# Patient Record
Sex: FEMALE | Race: WHITE | NOT HISPANIC OR LATINO | URBAN - METROPOLITAN AREA
[De-identification: names, ages, dates, MRNs, and addresses within clinical notes are randomized per-mention and may not be internally consistent; named-entity substitution may affect disease eponyms.]

---

## 2018-03-11 ENCOUNTER — EMERGENCY (EMERGENCY)
Facility: HOSPITAL | Age: 83
LOS: 1 days | End: 2018-03-11
Attending: EMERGENCY MEDICINE
Payer: MEDICARE

## 2018-03-11 VITALS
SYSTOLIC BLOOD PRESSURE: 152 MMHG | HEIGHT: 61 IN | TEMPERATURE: 98 F | WEIGHT: 126.99 LBS | HEART RATE: 91 BPM | RESPIRATION RATE: 14 BRPM | DIASTOLIC BLOOD PRESSURE: 76 MMHG | OXYGEN SATURATION: 94 %

## 2018-03-11 VITALS
DIASTOLIC BLOOD PRESSURE: 74 MMHG | RESPIRATION RATE: 15 BRPM | TEMPERATURE: 98 F | HEART RATE: 84 BPM | OXYGEN SATURATION: 95 % | SYSTOLIC BLOOD PRESSURE: 158 MMHG

## 2018-03-11 LAB
ALBUMIN SERPL ELPH-MCNC: 3.8 G/DL — SIGNIFICANT CHANGE UP (ref 3.3–5)
ALP SERPL-CCNC: 58 U/L — SIGNIFICANT CHANGE UP (ref 40–120)
ALT FLD-CCNC: 19 U/L — SIGNIFICANT CHANGE UP (ref 12–78)
ANION GAP SERPL CALC-SCNC: 9 MMOL/L — SIGNIFICANT CHANGE UP (ref 5–17)
AST SERPL-CCNC: 27 U/L — SIGNIFICANT CHANGE UP (ref 15–37)
BASOPHILS # BLD AUTO: 0.1 K/UL — SIGNIFICANT CHANGE UP (ref 0–0.2)
BASOPHILS NFR BLD AUTO: 0.8 % — SIGNIFICANT CHANGE UP (ref 0–2)
BILIRUB SERPL-MCNC: 0.6 MG/DL — SIGNIFICANT CHANGE UP (ref 0.2–1.2)
BLD GP AB SCN SERPL QL: SIGNIFICANT CHANGE UP
BUN SERPL-MCNC: 21 MG/DL — SIGNIFICANT CHANGE UP (ref 7–23)
CALCIUM SERPL-MCNC: 9.2 MG/DL — SIGNIFICANT CHANGE UP (ref 8.5–10.1)
CHLORIDE SERPL-SCNC: 107 MMOL/L — SIGNIFICANT CHANGE UP (ref 96–108)
CO2 SERPL-SCNC: 26 MMOL/L — SIGNIFICANT CHANGE UP (ref 22–31)
CREAT SERPL-MCNC: 1 MG/DL — SIGNIFICANT CHANGE UP (ref 0.5–1.3)
EOSINOPHIL # BLD AUTO: 0.3 K/UL — SIGNIFICANT CHANGE UP (ref 0–0.5)
EOSINOPHIL NFR BLD AUTO: 2.2 % — SIGNIFICANT CHANGE UP (ref 0–6)
GLUCOSE SERPL-MCNC: 104 MG/DL — HIGH (ref 70–99)
HCT VFR BLD CALC: 37.7 % — SIGNIFICANT CHANGE UP (ref 34.5–45)
HGB BLD-MCNC: 12.8 G/DL — SIGNIFICANT CHANGE UP (ref 11.5–15.5)
INR BLD: 1.05 RATIO — SIGNIFICANT CHANGE UP (ref 0.88–1.16)
LIDOCAIN IGE QN: 264 U/L — SIGNIFICANT CHANGE UP (ref 73–393)
LYMPHOCYTES # BLD AUTO: 17.4 % — SIGNIFICANT CHANGE UP (ref 13–44)
LYMPHOCYTES # BLD AUTO: 2.5 K/UL — SIGNIFICANT CHANGE UP (ref 1–3.3)
MCHC RBC-ENTMCNC: 29.9 PG — SIGNIFICANT CHANGE UP (ref 27–34)
MCHC RBC-ENTMCNC: 34 GM/DL — SIGNIFICANT CHANGE UP (ref 32–36)
MCV RBC AUTO: 87.9 FL — SIGNIFICANT CHANGE UP (ref 80–100)
MONOCYTES # BLD AUTO: 1 K/UL — HIGH (ref 0–0.9)
MONOCYTES NFR BLD AUTO: 6.6 % — SIGNIFICANT CHANGE UP (ref 1–9)
NEUTROPHILS # BLD AUTO: 10.5 K/UL — HIGH (ref 1.8–7.4)
NEUTROPHILS NFR BLD AUTO: 73 % — SIGNIFICANT CHANGE UP (ref 43–77)
OB PNL STL: POSITIVE
PLATELET # BLD AUTO: 219 K/UL — SIGNIFICANT CHANGE UP (ref 150–400)
POTASSIUM SERPL-MCNC: 3.8 MMOL/L — SIGNIFICANT CHANGE UP (ref 3.5–5.3)
POTASSIUM SERPL-SCNC: 3.8 MMOL/L — SIGNIFICANT CHANGE UP (ref 3.5–5.3)
PROT SERPL-MCNC: 7.6 G/DL — SIGNIFICANT CHANGE UP (ref 6–8.3)
PROTHROM AB SERPL-ACNC: 11.5 SEC — SIGNIFICANT CHANGE UP (ref 9.8–12.7)
RBC # BLD: 4.29 M/UL — SIGNIFICANT CHANGE UP (ref 3.8–5.2)
RBC # FLD: 11.6 % — SIGNIFICANT CHANGE UP (ref 10.3–14.5)
SODIUM SERPL-SCNC: 142 MMOL/L — SIGNIFICANT CHANGE UP (ref 135–145)
WBC # BLD: 14.3 K/UL — HIGH (ref 3.8–10.5)
WBC # FLD AUTO: 14.3 K/UL — HIGH (ref 3.8–10.5)

## 2018-03-11 PROCEDURE — 80053 COMPREHEN METABOLIC PANEL: CPT

## 2018-03-11 PROCEDURE — 86901 BLOOD TYPING SEROLOGIC RH(D): CPT

## 2018-03-11 PROCEDURE — 99284 EMERGENCY DEPT VISIT MOD MDM: CPT | Mod: 25

## 2018-03-11 PROCEDURE — 74177 CT ABD & PELVIS W/CONTRAST: CPT | Mod: 26

## 2018-03-11 PROCEDURE — 85027 COMPLETE CBC AUTOMATED: CPT

## 2018-03-11 PROCEDURE — 86850 RBC ANTIBODY SCREEN: CPT

## 2018-03-11 PROCEDURE — 83690 ASSAY OF LIPASE: CPT

## 2018-03-11 PROCEDURE — 99285 EMERGENCY DEPT VISIT HI MDM: CPT

## 2018-03-11 PROCEDURE — 86900 BLOOD TYPING SEROLOGIC ABO: CPT

## 2018-03-11 PROCEDURE — 74177 CT ABD & PELVIS W/CONTRAST: CPT

## 2018-03-11 PROCEDURE — 85610 PROTHROMBIN TIME: CPT

## 2018-03-11 PROCEDURE — 82272 OCCULT BLD FECES 1-3 TESTS: CPT

## 2018-03-11 RX ORDER — IOHEXOL 300 MG/ML
30 INJECTION, SOLUTION INTRAVENOUS ONCE
Qty: 0 | Refills: 0 | Status: COMPLETED | OUTPATIENT
Start: 2018-03-11 | End: 2018-03-11

## 2018-03-11 RX ORDER — METRONIDAZOLE 500 MG
500 TABLET ORAL ONCE
Qty: 0 | Refills: 0 | Status: COMPLETED | OUTPATIENT
Start: 2018-03-11 | End: 2018-03-11

## 2018-03-11 RX ORDER — CIPROFLOXACIN LACTATE 400MG/40ML
500 VIAL (ML) INTRAVENOUS ONCE
Qty: 0 | Refills: 0 | Status: COMPLETED | OUTPATIENT
Start: 2018-03-11 | End: 2018-03-11

## 2018-03-11 RX ORDER — METRONIDAZOLE 500 MG
1 TABLET ORAL
Qty: 30 | Refills: 0
Start: 2018-03-11 | End: 2018-03-20

## 2018-03-11 RX ORDER — MOXIFLOXACIN HYDROCHLORIDE TABLETS, 400 MG 400 MG/1
1 TABLET, FILM COATED ORAL
Qty: 20 | Refills: 0
Start: 2018-03-11 | End: 2018-03-20

## 2018-03-11 RX ADMIN — Medication 500 MILLIGRAM(S): at 19:06

## 2018-03-11 RX ADMIN — IOHEXOL 30 MILLILITER(S): 300 INJECTION, SOLUTION INTRAVENOUS at 15:24

## 2018-03-11 NOTE — ED PROVIDER NOTE - PROGRESS NOTE DETAILS
paged GI Dr. Mcneil [phone], awaiting call back patient comfortable, abdmonen soft, non tender, wants to go home, spoke with Dr. Mcneil, case discussed, will f/u in office, patient understands to return to ER if having severe pain, fever or worsening of symptoms

## 2018-03-11 NOTE — ED PROVIDER NOTE - OBJECTIVE STATEMENT
89 female presents to ER c/o rectal bleeding, started last night, bright red blood per rectum, associated with lower abdominal cramping, continued today and came to the ER.

## 2018-03-11 NOTE — ED PROVIDER NOTE - CARE PLAN
Principal Discharge DX:	Rectal bleed Principal Discharge DX:	Rectal bleed  Secondary Diagnosis:	Colitis

## 2018-03-11 NOTE — ED ADULT NURSE NOTE - CHPI ED SYMPTOMS NEG
no abdominal distension/no burning urination/no chills/no dysuria/no diarrhea/no vomiting/no fever/no hematuria/no nausea

## 2021-05-16 ENCOUNTER — EMERGENCY (EMERGENCY)
Facility: HOSPITAL | Age: 86
LOS: 1 days | Discharge: HOME | End: 2021-05-16
Attending: EMERGENCY MEDICINE | Admitting: EMERGENCY MEDICINE
Payer: MEDICARE

## 2021-05-16 VITALS
WEIGHT: 100.09 LBS | SYSTOLIC BLOOD PRESSURE: 136 MMHG | DIASTOLIC BLOOD PRESSURE: 80 MMHG | OXYGEN SATURATION: 95 % | TEMPERATURE: 98 F | RESPIRATION RATE: 14 BRPM | HEIGHT: 61 IN | HEART RATE: 76 BPM

## 2021-05-16 PROCEDURE — 73060 X-RAY EXAM OF HUMERUS: CPT | Mod: 26,LT

## 2021-05-16 PROCEDURE — 73502 X-RAY EXAM HIP UNI 2-3 VIEWS: CPT

## 2021-05-16 PROCEDURE — 99284 EMERGENCY DEPT VISIT MOD MDM: CPT | Mod: 25

## 2021-05-16 PROCEDURE — 73090 X-RAY EXAM OF FOREARM: CPT

## 2021-05-16 PROCEDURE — 99284 EMERGENCY DEPT VISIT MOD MDM: CPT

## 2021-05-16 PROCEDURE — 73502 X-RAY EXAM HIP UNI 2-3 VIEWS: CPT | Mod: 26,LT

## 2021-05-16 PROCEDURE — 73090 X-RAY EXAM OF FOREARM: CPT | Mod: 26,LT

## 2021-05-16 PROCEDURE — 73070 X-RAY EXAM OF ELBOW: CPT

## 2021-05-16 PROCEDURE — 73060 X-RAY EXAM OF HUMERUS: CPT

## 2021-05-16 PROCEDURE — 73070 X-RAY EXAM OF ELBOW: CPT | Mod: 26,LT

## 2021-05-16 NOTE — ED PROVIDER NOTE - CARE PROVIDER_API CALL
Olive Pitt)  Orthopaedic Surgery Surgery  [address] [place] [address]  Phone: [phone]  Fax: [phone]  Follow Up Time:

## 2021-05-16 NOTE — ED PROVIDER NOTE - PATIENT PORTAL LINK FT
Yes
You can access the FollowMyHealth Patient Portal offered by [facility] by registering at the following website: [email or web address]. By joining [facility]’s FollowMyHealth portal, you will also be able to view your health information using other applications (apps) compatible with our system.

## 2021-05-16 NOTE — ED PROVIDER NOTE - NSFOLLOWUPINSTRUCTIONS_ED_ALL_ED_FT
Sling to left arm for the next 4-5 days  Elevate arm until swelling resolves  Follow-up with Orthopedics if pains do not resolve within 7-10 days  Return here if needed

## 2021-05-16 NOTE — ED PROVIDER NOTE - PROGRESS NOTE DETAILS
Possibility of Fx still exists>>>>declines CT scan and she appropriately stated that not to much would be done with the results so why do it. Patient is a retired nurse.

## 2021-05-16 NOTE — ED PROVIDER NOTE - OBJECTIVE STATEMENT
89 yo white female, tripped and fell yesterday and since that time has been experiencing pain, swelling and discoloration to area of left elbow. In addition patient states that left groin is also a little painful but is still able ambulate. Did not hit head. No neck/chest/abdominal or back pains. No numbness or weakness to left upper extremity.

## 2021-05-16 NOTE — ED ADULT NURSE NOTE - NSIMPLEMENTINTERV_GEN_ALL_ED
Implemented All Fall with Harm Risk Interventions:  [place] to call system. Call bell, personal items and telephone within reach. Instruct patient to call for assistance. Room bathroom lighting operational. Non-slip footwear when patient is off stretcher. Physically safe environment: no spills, clutter or unnecessary equipment. Stretcher in lowest position, wheels locked, appropriate side rails in place. Provide visual cue, wrist band, yellow gown, etc. Monitor gait and stability. Monitor for mental status changes and reorient to person, place, and time. Review medications for side effects contributing to fall risk. Reinforce activity limits and safety measures with patient and family. Provide visual clues: red socks.

## 2021-05-16 NOTE — ED PROVIDER NOTE - MUSCULOSKELETAL, MLM
Marked swelling and ecchymosis distal left arm/elbow and proximal left forearm with FROM at elbow with intact N/V LUE. Hips and pelvis non tender and stable.

## 2021-05-16 NOTE — ED PROVIDER NOTE - CONSTITUTIONAL, MLM
normal... Well appearing, thin, elderly white female, awake, alert, oriented to person, place, time/situation and in no apparent distress.

## 2021-05-17 PROBLEM — M54.5 LOW BACK PAIN: Chronic | Status: ACTIVE | Noted: 2018-03-11

## 2021-05-17 PROBLEM — E78.5 HYPERLIPIDEMIA, UNSPECIFIED: Chronic | Status: ACTIVE | Noted: 2018-03-11

## 2021-05-17 PROBLEM — I10 ESSENTIAL (PRIMARY) HYPERTENSION: Chronic | Status: ACTIVE | Noted: 2018-03-11

## 2021-05-17 PROBLEM — J45.909 UNSPECIFIED ASTHMA, UNCOMPLICATED: Chronic | Status: ACTIVE | Noted: 2018-03-11

## 2021-05-22 NOTE — ED POST DISCHARGE NOTE - DETAILS
advised pt on abnormal results. advised return to ed for re-eval if pt has pain in the area. pt reports elbow and hip pain at this time. advised return to ed for reeval. pt would prefer to follow up with her own physician and then ortho. pt reports she removed sling. advised pt she should wear splint.

## 2021-05-28 ENCOUNTER — INPATIENT (INPATIENT)
Facility: HOSPITAL | Age: 86
LOS: 3 days | Discharge: NURSING, REHAB OR LONG-TERM CARE | DRG: 521 | End: 2021-06-01
Attending: FAMILY MEDICINE | Admitting: GENERAL PRACTICE
Payer: MEDICARE

## 2021-05-28 ENCOUNTER — TRANSCRIPTION ENCOUNTER (OUTPATIENT)
Age: 86
End: 2021-05-28

## 2021-05-28 VITALS
TEMPERATURE: 98 F | DIASTOLIC BLOOD PRESSURE: 71 MMHG | HEIGHT: 61 IN | OXYGEN SATURATION: 96 % | WEIGHT: 100.09 LBS | RESPIRATION RATE: 18 BRPM | HEART RATE: 70 BPM | SYSTOLIC BLOOD PRESSURE: 153 MMHG

## 2021-05-28 DIAGNOSIS — E78.5 HYPERLIPIDEMIA, UNSPECIFIED: ICD-10-CM

## 2021-05-28 DIAGNOSIS — S72.002A FRACTURE OF UNSPECIFIED PART OF NECK OF LEFT FEMUR, INITIAL ENCOUNTER FOR CLOSED FRACTURE: ICD-10-CM

## 2021-05-28 DIAGNOSIS — J44.9 CHRONIC OBSTRUCTIVE PULMONARY DISEASE, UNSPECIFIED: ICD-10-CM

## 2021-05-28 DIAGNOSIS — Z29.9 ENCOUNTER FOR PROPHYLACTIC MEASURES, UNSPECIFIED: ICD-10-CM

## 2021-05-28 DIAGNOSIS — I35.0 NONRHEUMATIC AORTIC (VALVE) STENOSIS: ICD-10-CM

## 2021-05-28 DIAGNOSIS — I10 ESSENTIAL (PRIMARY) HYPERTENSION: ICD-10-CM

## 2021-05-28 DIAGNOSIS — M10.9 GOUT, UNSPECIFIED: ICD-10-CM

## 2021-05-28 LAB
ANION GAP SERPL CALC-SCNC: 6 MMOL/L — SIGNIFICANT CHANGE UP (ref 5–17)
APTT BLD: 29.4 SEC — SIGNIFICANT CHANGE UP (ref 27.5–35.5)
BUN SERPL-MCNC: 23 MG/DL — SIGNIFICANT CHANGE UP (ref 7–23)
CALCIUM SERPL-MCNC: 8.8 MG/DL — SIGNIFICANT CHANGE UP (ref 8.5–10.1)
CHLORIDE SERPL-SCNC: 108 MMOL/L — SIGNIFICANT CHANGE UP (ref 96–108)
CO2 SERPL-SCNC: 29 MMOL/L — SIGNIFICANT CHANGE UP (ref 22–31)
CREAT SERPL-MCNC: 0.8 MG/DL — SIGNIFICANT CHANGE UP (ref 0.5–1.3)
GLUCOSE SERPL-MCNC: 113 MG/DL — HIGH (ref 70–99)
HCT VFR BLD CALC: 32.3 % — LOW (ref 34.5–45)
HGB BLD-MCNC: 10.5 G/DL — LOW (ref 11.5–15.5)
INR BLD: 1.06 RATIO — SIGNIFICANT CHANGE UP (ref 0.88–1.16)
MCHC RBC-ENTMCNC: 29.8 PG — SIGNIFICANT CHANGE UP (ref 27–34)
MCHC RBC-ENTMCNC: 32.5 GM/DL — SIGNIFICANT CHANGE UP (ref 32–36)
MCV RBC AUTO: 91.8 FL — SIGNIFICANT CHANGE UP (ref 80–100)
NRBC # BLD: 0 /100 WBCS — SIGNIFICANT CHANGE UP (ref 0–0)
PLATELET # BLD AUTO: 287 K/UL — SIGNIFICANT CHANGE UP (ref 150–400)
POTASSIUM SERPL-MCNC: 3.6 MMOL/L — SIGNIFICANT CHANGE UP (ref 3.5–5.3)
POTASSIUM SERPL-SCNC: 3.6 MMOL/L — SIGNIFICANT CHANGE UP (ref 3.5–5.3)
PROTHROM AB SERPL-ACNC: 12.4 SEC — SIGNIFICANT CHANGE UP (ref 10.6–13.6)
RBC # BLD: 3.52 M/UL — LOW (ref 3.8–5.2)
RBC # FLD: 13.8 % — SIGNIFICANT CHANGE UP (ref 10.3–14.5)
SARS-COV-2 RNA SPEC QL NAA+PROBE: SIGNIFICANT CHANGE UP
SODIUM SERPL-SCNC: 143 MMOL/L — SIGNIFICANT CHANGE UP (ref 135–145)
WBC # BLD: 12.99 K/UL — HIGH (ref 3.8–10.5)
WBC # FLD AUTO: 12.99 K/UL — HIGH (ref 3.8–10.5)

## 2021-05-28 PROCEDURE — 73502 X-RAY EXAM HIP UNI 2-3 VIEWS: CPT | Mod: 26,LT

## 2021-05-28 PROCEDURE — 93010 ELECTROCARDIOGRAM REPORT: CPT

## 2021-05-28 PROCEDURE — 73552 X-RAY EXAM OF FEMUR 2/>: CPT | Mod: 26,LT

## 2021-05-28 PROCEDURE — 99284 EMERGENCY DEPT VISIT MOD MDM: CPT

## 2021-05-28 PROCEDURE — 99221 1ST HOSP IP/OBS SF/LOW 40: CPT | Mod: GC

## 2021-05-28 PROCEDURE — 71045 X-RAY EXAM CHEST 1 VIEW: CPT | Mod: 26

## 2021-05-28 RX ORDER — SODIUM CHLORIDE 9 MG/ML
1000 INJECTION, SOLUTION INTRAVENOUS
Refills: 0 | Status: DISCONTINUED | OUTPATIENT
Start: 2021-05-28 | End: 2021-05-29

## 2021-05-28 RX ORDER — AMLODIPINE BESYLATE 2.5 MG/1
1 TABLET ORAL
Qty: 0 | Refills: 0 | DISCHARGE

## 2021-05-28 RX ORDER — MULTIVIT-MIN/FERROUS GLUCONATE 9 MG/15 ML
1 LIQUID (ML) ORAL
Qty: 0 | Refills: 0 | DISCHARGE

## 2021-05-28 RX ORDER — OMEGA-3 ACID ETHYL ESTERS 1 G
1 CAPSULE ORAL
Qty: 0 | Refills: 0 | DISCHARGE

## 2021-05-28 RX ORDER — MULTIVIT-MIN/FERROUS GLUCONATE 9 MG/15 ML
1 LIQUID (ML) ORAL DAILY
Refills: 0 | Status: DISCONTINUED | OUTPATIENT
Start: 2021-05-28 | End: 2021-05-29

## 2021-05-28 RX ORDER — ESOMEPRAZOLE MAGNESIUM 40 MG/1
1 CAPSULE, DELAYED RELEASE ORAL
Qty: 0 | Refills: 0 | DISCHARGE

## 2021-05-28 RX ORDER — ALLOPURINOL 300 MG
100 TABLET ORAL
Refills: 0 | Status: DISCONTINUED | OUTPATIENT
Start: 2021-05-28 | End: 2021-05-29

## 2021-05-28 RX ORDER — OMEGA-3 ACID ETHYL ESTERS 1 G
2 CAPSULE ORAL
Refills: 0 | Status: DISCONTINUED | OUTPATIENT
Start: 2021-05-28 | End: 2021-05-29

## 2021-05-28 RX ORDER — SIMVASTATIN 20 MG/1
20 TABLET, FILM COATED ORAL AT BEDTIME
Refills: 0 | Status: DISCONTINUED | OUTPATIENT
Start: 2021-05-28 | End: 2021-05-29

## 2021-05-28 RX ORDER — MOMETASONE FUROATE 220 UG/1
1 INHALANT RESPIRATORY (INHALATION) DAILY
Refills: 0 | Status: DISCONTINUED | OUTPATIENT
Start: 2021-05-28 | End: 2021-05-29

## 2021-05-28 RX ORDER — ERGOCALCIFEROL 1.25 MG/1
1 CAPSULE ORAL
Qty: 0 | Refills: 0 | DISCHARGE

## 2021-05-28 RX ORDER — AMLODIPINE BESYLATE 2.5 MG/1
2.5 TABLET ORAL DAILY
Refills: 0 | Status: DISCONTINUED | OUTPATIENT
Start: 2021-05-28 | End: 2021-05-29

## 2021-05-28 RX ORDER — ALBUTEROL 90 UG/1
2 AEROSOL, METERED ORAL EVERY 6 HOURS
Refills: 0 | Status: DISCONTINUED | OUTPATIENT
Start: 2021-05-28 | End: 2021-05-29

## 2021-05-28 RX ORDER — ALLOPURINOL 300 MG
1 TABLET ORAL
Qty: 0 | Refills: 0 | DISCHARGE

## 2021-05-28 RX ORDER — PANTOPRAZOLE SODIUM 20 MG/1
40 TABLET, DELAYED RELEASE ORAL
Refills: 0 | Status: DISCONTINUED | OUTPATIENT
Start: 2021-05-28 | End: 2021-05-29

## 2021-05-28 RX ORDER — ALBUTEROL 90 UG/1
2 AEROSOL, METERED ORAL
Qty: 0 | Refills: 0 | DISCHARGE

## 2021-05-28 RX ORDER — FLUTICASONE PROPIONATE 220 MCG
1 AEROSOL WITH ADAPTER (GRAM) INHALATION
Qty: 0 | Refills: 0 | DISCHARGE

## 2021-05-28 RX ORDER — SIMVASTATIN 20 MG/1
1 TABLET, FILM COATED ORAL
Qty: 0 | Refills: 0 | DISCHARGE

## 2021-05-28 RX ADMIN — SIMVASTATIN 20 MILLIGRAM(S): 20 TABLET, FILM COATED ORAL at 23:00

## 2021-05-28 NOTE — H&P ADULT - NSHPPHYSICALEXAM_GEN_ALL_CORE
T(C): 36.9 (05-28-21 @ 20:58), Max: 36.9 (05-28-21 @ 20:58)  HR: 71 (05-28-21 @ 20:58) (70 - 71)  BP: 165/72 (05-28-21 @ 20:58) (153/71 - 165/72)  RR: 19 (05-28-21 @ 20:58) (18 - 19)  SpO2: 97% (05-28-21 @ 20:58) (96% - 97%)    GENERAL: patient appears frail elderly, sitting in bed, no acute distress, appropriate, pleasant  EYES: sclera clear, no exudates, EOMI, PERRL  ENMT: oropharynx clear without erythema, no exudates, moist mucous membranes  LUNGS: good air entry bilaterally, clear to auscultation, symmetric breath sounds, no wheezing or rhonchi appreciated  HEART: S1, regular rate and rhythm,3/6  holosystolic murmur heard best at RUSB and LUSB, no lower extremity edema, pulses 2+  GASTROINTESTINAL: abdomen is soft, nontender, nondistended, normoactive bowel sounds, no palpable masses  INTEGUMENT: thin skin, scattered ecchymoses of left upper arm and lower arm  MUSCULOSKELETAL: no clubbing or cyanosis, no deformity  NEUROLOGIC: awake, alert, oriented x3, moves all limbs, no sensory deficits,   PSYCHIATRIC: mood is good, affect is congruent, linear and logical thought process  HEME/LYMPH: scattered ecchymoses of left upper arm and lower arm, large hematoma of the left elbow

## 2021-05-28 NOTE — ED PROVIDER NOTE - WET READ LAUNCH FT
There are 2 Wet Read(s) to document. There are 3 Wet Read(s) to document. There are no Wet Read(s) to document.

## 2021-05-28 NOTE — H&P ADULT - NSHPREVIEWOFSYSTEMS_GEN_ALL_CORE
CONSTITUTIONAL: denies fever, chills, fatigue, weakness  HEENT: denies blurred vision, sore throat  SKIN: denies new lesions, rash  CARDIOVASCULAR: denies chest pain, chest pressure, palpitations  RESPIRATORY: denies shortness of breath, sputum production  GASTROINTESTINAL: denies nausea, vomiting, diarrhea, abdominal pain  GENITOURINARY: denies dysuria, discharge  NEUROLOGICAL: denies numbness, headache, focal weakness  MUSCULOSKELETAL: ADMITS new joint pain, denies muscle aches  HEMATOLOGIC: ADMITS gross bleeding, hematoma of left elbow  LYMPHATICS: denies enlarged lymph nodes, extremity swelling  PSYCHIATRIC: denies recent changes in anxiety, depression  ENDOCRINOLOGIC: denies sweating, cold or heat intolerance

## 2021-05-28 NOTE — H&P ADULT - PROBLEM SELECTOR PLAN 4
- Chronic, well controlled, asymptomatic. Denies use of her proAir in recent weeks.  - Home medications Flovent inhaler 110 daily and proAir inhaler as needed

## 2021-05-28 NOTE — H&P ADULT - NSHPSOCIALHISTORY_GEN_ALL_CORE
Lives with her children  Ambulates with walker in the house and cane outside the home, Performs all ADLs  Denies tobacco use  Denies etoh use   Denies other drug use

## 2021-05-28 NOTE — ED ADULT NURSE NOTE - NSIMPLEMENTINTERV_GEN_ALL_ED
Implemented All Fall with Harm Risk Interventions:  [place] to call system. Call bell, personal items and telephone within reach. Instruct patient to call for assistance. Room bathroom lighting operational. Non-slip footwear when patient is off stretcher. Physically safe environment: no spills, clutter or unnecessary equipment. Stretcher in lowest position, wheels locked, appropriate side rails in place. Provide visual cue, wrist band, yellow gown, etc. Monitor gait and stability. Monitor for mental status changes and reorient to person, place, and time. Review medications for side effects contributing to fall risk. Reinforce activity limits and safety measures with patient and family. Provide visual clues: red socks.

## 2021-05-28 NOTE — H&P ADULT - HISTORY OF PRESENT ILLNESS
89 yo F PMHx HTN, HLD, Asthma presenting for Left hip pain. nt reports that she was instructed to come to the ED today after MRI revealed nondisplaced left hip fracture. Patient reports that she has been ambulating with pain since the injury.        ED Course  VS T 97.7 HR 70 /71 RR 18 SpO2 96% on RA    Labs significant for WBC 11.9, Hg 10.5   EKG   89 yo F PMHx Aortic Stenosis,  HTN, HLD, Asthma/COPD, GERD, Gout, presenting for Left hip pain on ambulating x  2 weeks.  Patient reports that she was instructed to come to the ED today after MRI revealed nondisplaced left hip fracture. Patient reports that she has been ambulating with pain since the fall using a walker. For the pain she has been taking Ecotin 325 two pills a day for pain relief for the hip. She states the hematoma on her elbow is painful only when touched.  Patient reports she has a known history of aortic valve disorder and her Cardiologist Dr. Oliveira is closely following her with TTE to determine if she is a candidate for valve replacement.  Patient states can walk distances on flat surfaces without shortness of breath but does have TITUS on hills or stairs. She states she took all her medications today prior to coming to the hospital tonight.  Currently denies CP, SOB, palpitations, dizziness, headache, nausea, or pain.       ED Course: Patient did not receive any treatments in the ED prior to admission  VS T 97.7 HR 70 /71 RR 18 SpO2 96% on RA    Labs significant for WBC 11.9, Hg 10.5   EKG ; NSR with infrequent PVC and RBBB

## 2021-05-28 NOTE — CONSULT NOTE ADULT - SUBJECTIVE AND OBJECTIVE BOX
89F presents to [facility] with son for evaluation of left hip fracture. Patient reports that she was sent in to the ED by Dr. Gamez with plans for surgery with Dr. Marx tomorrow for left hip fracture. Patient reports that she had fall on May 16th and was evaluated in the Emergency Department here and subsequently discharged after XR imaging. Patient was called by ED and instructed to follow up with orthopedist for evaluation of left elbow fracture so patient followed up with Dr. Gamez in the office. She reports that she was told by Dr. Gamez that she did not have a left elbow fracture, but he was concerned about her left hip pain so sent her for MRI imaging since XR imaging did not reveal fracture. Patient reports that she was instructed to come to the ED today after MRI revealed nondisplaced left hip fracture. Patient reports that she has been ambulating with pain since the injury. Patient complains of hematoma/swelling and bruising of her left elbow but no significant pain. Otherwise patient denies head strike/LOC, numbness/tingling, weakness, any other acute orthopedic injury or complaint.    Vital Signs Last 24 Hrs  T(C): 36.5 (28 May 2021 18:30), Max: 36.5 (28 May 2021 18:30)  T(F): 97.7 (28 May 2021 18:30), Max: 97.7 (28 May 2021 18:30)  HR: 70 (28 May 2021 18:30) (70 - 70)  BP: 153/71 (28 May 2021 18:30) (153/71 - 153/71)  BP(mean): --  RR: 18 (28 May 2021 18:30) (18 - 18)  SpO2: 96% (28 May 2021 18:30) (96% - 96%)    Exam:  General: Awake, alert, no acute distress  LLE:   Skin intact. No obvious abrasions/lacerations. No obvious deformity appreciated  TTP over proximal femur/hip. No other bony TTP on exam.   Able to SLR bilaterally  Minimal to no pain with axial load/log roll.   SILT, +Q/H/Gsc/TA/EHL/FHL  DP pulse intact  Compartments soft and compressible  No calf tenderness bilaterally    Secondary Assessment:  NC/AT, NTTP of clavicles, NTTP of C-,T-,L-Spine  UEs: (+) large hematoma over left elbow with diffuse ecchymosis over left elbow/forearm but no overlying TTP; NTTP of Shoulders, Elbows, Wrists, Hands; NT with AROM/PROM of Shoulders, Elbows, Wrists, Hands; AIN/PIN/Med/Uln/Msc/Rad/Ax intact  RLE: Able to SLR, NT with Log Roll, NT with Heel Strike, NTTP of Hip, Knee, Ankle, Foot; NT with AROM/PROM of Hip, Knee, Ankle, Foot; Q/H/Gsc/TA/EHL/FHL intact    XR imaging ordered/pending.

## 2021-05-28 NOTE — ED PROVIDER NOTE - CLINICAL SUMMARY MEDICAL DECISION MAKING FREE TEXT BOX
89 yo F sent in by Dr. Gamez with L impacted femor neck fx sp mechanical fall 2 wks ago found on outpatient MRI today-- here for OR tomorrow

## 2021-05-28 NOTE — H&P ADULT - PROBLEM SELECTOR PLAN 1
- Nondisplaced Left hip fracture Evaluated by Orthopedist group Dr. Marx  - Non weight bearing per orthopedics  - Patient reports she does not have pain at rest  - DASH diet, NPO after midnight for possible surgery tomorrow  - Given severity of AS would defer medical clearance for surgery based on TTE and cardiologist evaluation in the AM

## 2021-05-28 NOTE — ED PROVIDER NOTE - OBJECTIVE STATEMENT
89 yo F PMHx HTN, HLD presents to ED for evaluation of L impacted femoral neck fracture sustained after mechanical fall ~2 weeks ago. Fx diagnosed on outpatient MRI today. Pt reports pain to L hip worse with walking, able to ambulate without walker. Sent in by Dr. Gaemz for surgery with Dr. Marx tomorrow morning. Pt currently denies any pain or symptoms.

## 2021-05-28 NOTE — ED ADULT NURSE NOTE - OBJECTIVE STATEMENT
Pt. received alert and oriented x3 with chief complaint of being sent in by PMD for hip surgery due to fall in early May. Pt. denies any pain to affected area. Pt. presents w/ hematoma to left elbow from fall.

## 2021-05-28 NOTE — H&P ADULT - ATTENDING COMMENTS
89 y/o F with history of asthma/COPD, HTN, HLD who presents to the ED for evaluation of left hip pain.   Patient fell two weeks ago at home. She missed a step and fell on her left side. She has been ambulating with a walker. She had a MRI showing a nondisplaced left hip fracture.     A/P:   1. L nondisplaced impacted femoral neck fracture.   2. COPD/asthma   3. HTN  4. HLD  5. AS    Patient has a history of AS and informed us that she was being worked up for TAVR. She has a prominent murmur on exam. Will check ECHO and consult cardio. Medical clearance pending ECHO to determine severity of AS and cardiac input. home meds reordered. NPO after midnight. SCDs for tonight.

## 2021-05-28 NOTE — H&P ADULT - ASSESSMENT
89 yo F PMHx Aortic Stenosis,  HTN, HLD, Asthma/COPD, GERD, Gout, presenting for Left hip pain on ambulating x  2 weeks. Admitted for reduction of nondisplaced left hip fracture.

## 2021-05-28 NOTE — ED ADULT NURSE NOTE - CHIEF COMPLAINT QUOTE
Patient is a 89 yo female was seen 5/16/2021 in [facility] for fall patient went to orthopedist past Wednesday and received a MRI and MRI showed a left partial hip fracture Patient sent by CHANDU Gamez for surgery to stabilize left hip

## 2021-05-28 NOTE — ED ADULT TRIAGE NOTE - CHIEF COMPLAINT QUOTE
Patient is a 89 yo female was seen 5/16/2021 in [facility] for fall patient went to orthopedist past Wednesday and received a MRI and MRI showed a left partial hip fracture Patient sent by CHANDU Gamez for surgery to stabilize left hip

## 2021-05-28 NOTE — ED PROVIDER NOTE - ATTENDING CONTRIBUTION TO CARE
Pt seen and examined and d/w PA.  agree with a and p.  pt is a 89 yo female sp fall about 2 weeks ago and with hip pain, pt at that time told imaging of left hip normal. pt state pain increased and no further falls and was ambulating with difficulty. pt today fu with dr scales of ortho and o/p mri revealed left hip fx. pt sent to er for preop eval and admit.  pt on exam in nad, lungs cta, cor: rrr no mgr, abd soft, nt, ext, left hip ttp, no deformity on exam, sm intact, 2+ pulses. check labs, ortho residents consulted, admit med

## 2021-05-29 ENCOUNTER — RESULT REVIEW (OUTPATIENT)
Age: 86
End: 2021-05-29

## 2021-05-29 DIAGNOSIS — R33.9 RETENTION OF URINE, UNSPECIFIED: ICD-10-CM

## 2021-05-29 LAB
ANION GAP SERPL CALC-SCNC: 6 MMOL/L — SIGNIFICANT CHANGE UP (ref 5–17)
ANION GAP SERPL CALC-SCNC: 6 MMOL/L — SIGNIFICANT CHANGE UP (ref 5–17)
APPEARANCE UR: CLEAR — SIGNIFICANT CHANGE UP
APTT BLD: 28.1 SEC — SIGNIFICANT CHANGE UP (ref 27.5–35.5)
BILIRUB UR-MCNC: NEGATIVE — SIGNIFICANT CHANGE UP
BUN SERPL-MCNC: 14 MG/DL — SIGNIFICANT CHANGE UP (ref 7–23)
BUN SERPL-MCNC: 15 MG/DL — SIGNIFICANT CHANGE UP (ref 7–23)
CALCIUM SERPL-MCNC: 8.3 MG/DL — LOW (ref 8.5–10.1)
CALCIUM SERPL-MCNC: 9 MG/DL — SIGNIFICANT CHANGE UP (ref 8.5–10.1)
CHLORIDE SERPL-SCNC: 105 MMOL/L — SIGNIFICANT CHANGE UP (ref 96–108)
CHLORIDE SERPL-SCNC: 105 MMOL/L — SIGNIFICANT CHANGE UP (ref 96–108)
CO2 SERPL-SCNC: 29 MMOL/L — SIGNIFICANT CHANGE UP (ref 22–31)
CO2 SERPL-SCNC: 32 MMOL/L — HIGH (ref 22–31)
COLOR SPEC: YELLOW — SIGNIFICANT CHANGE UP
COVID-19 SPIKE DOMAIN AB INTERP: POSITIVE
COVID-19 SPIKE DOMAIN ANTIBODY RESULT: 48.5 U/ML — HIGH
CREAT SERPL-MCNC: 0.69 MG/DL — SIGNIFICANT CHANGE UP (ref 0.5–1.3)
CREAT SERPL-MCNC: 0.75 MG/DL — SIGNIFICANT CHANGE UP (ref 0.5–1.3)
DIFF PNL FLD: ABNORMAL
GLUCOSE SERPL-MCNC: 152 MG/DL — HIGH (ref 70–99)
GLUCOSE SERPL-MCNC: 94 MG/DL — SIGNIFICANT CHANGE UP (ref 70–99)
GLUCOSE UR QL: NEGATIVE — SIGNIFICANT CHANGE UP
HCT VFR BLD CALC: 31.6 % — LOW (ref 34.5–45)
HCT VFR BLD CALC: 35.9 % — SIGNIFICANT CHANGE UP (ref 34.5–45)
HGB BLD-MCNC: 10.2 G/DL — LOW (ref 11.5–15.5)
HGB BLD-MCNC: 11.6 G/DL — SIGNIFICANT CHANGE UP (ref 11.5–15.5)
INR BLD: 1.12 RATIO — SIGNIFICANT CHANGE UP (ref 0.88–1.16)
KETONES UR-MCNC: NEGATIVE — SIGNIFICANT CHANGE UP
LEUKOCYTE ESTERASE UR-ACNC: NEGATIVE — SIGNIFICANT CHANGE UP
MCHC RBC-ENTMCNC: 29.1 PG — SIGNIFICANT CHANGE UP (ref 27–34)
MCHC RBC-ENTMCNC: 29.4 PG — SIGNIFICANT CHANGE UP (ref 27–34)
MCHC RBC-ENTMCNC: 32.3 GM/DL — SIGNIFICANT CHANGE UP (ref 32–36)
MCHC RBC-ENTMCNC: 32.3 GM/DL — SIGNIFICANT CHANGE UP (ref 32–36)
MCV RBC AUTO: 90 FL — SIGNIFICANT CHANGE UP (ref 80–100)
MCV RBC AUTO: 91.1 FL — SIGNIFICANT CHANGE UP (ref 80–100)
NITRITE UR-MCNC: NEGATIVE — SIGNIFICANT CHANGE UP
NRBC # BLD: 0 /100 WBCS — SIGNIFICANT CHANGE UP (ref 0–0)
NRBC # BLD: 0 /100 WBCS — SIGNIFICANT CHANGE UP (ref 0–0)
PH UR: 8 — SIGNIFICANT CHANGE UP (ref 5–8)
PLATELET # BLD AUTO: 286 K/UL — SIGNIFICANT CHANGE UP (ref 150–400)
PLATELET # BLD AUTO: 308 K/UL — SIGNIFICANT CHANGE UP (ref 150–400)
POTASSIUM SERPL-MCNC: 3.6 MMOL/L — SIGNIFICANT CHANGE UP (ref 3.5–5.3)
POTASSIUM SERPL-MCNC: 4 MMOL/L — SIGNIFICANT CHANGE UP (ref 3.5–5.3)
POTASSIUM SERPL-SCNC: 3.6 MMOL/L — SIGNIFICANT CHANGE UP (ref 3.5–5.3)
POTASSIUM SERPL-SCNC: 4 MMOL/L — SIGNIFICANT CHANGE UP (ref 3.5–5.3)
PROT UR-MCNC: 30 MG/DL
PROTHROM AB SERPL-ACNC: 13 SEC — SIGNIFICANT CHANGE UP (ref 10.6–13.6)
RBC # BLD: 3.47 M/UL — LOW (ref 3.8–5.2)
RBC # BLD: 3.99 M/UL — SIGNIFICANT CHANGE UP (ref 3.8–5.2)
RBC # FLD: 13.6 % — SIGNIFICANT CHANGE UP (ref 10.3–14.5)
RBC # FLD: 13.7 % — SIGNIFICANT CHANGE UP (ref 10.3–14.5)
SARS-COV-2 IGG+IGM SERPL QL IA: 48.5 U/ML — HIGH
SARS-COV-2 IGG+IGM SERPL QL IA: POSITIVE
SODIUM SERPL-SCNC: 140 MMOL/L — SIGNIFICANT CHANGE UP (ref 135–145)
SODIUM SERPL-SCNC: 143 MMOL/L — SIGNIFICANT CHANGE UP (ref 135–145)
SP GR SPEC: 1.01 — SIGNIFICANT CHANGE UP (ref 1.01–1.02)
UROBILINOGEN FLD QL: NEGATIVE — SIGNIFICANT CHANGE UP
WBC # BLD: 13.6 K/UL — HIGH (ref 3.8–10.5)
WBC # BLD: 17.79 K/UL — HIGH (ref 3.8–10.5)
WBC # FLD AUTO: 13.6 K/UL — HIGH (ref 3.8–10.5)
WBC # FLD AUTO: 17.79 K/UL — HIGH (ref 3.8–10.5)

## 2021-05-29 PROCEDURE — 99233 SBSQ HOSP IP/OBS HIGH 50: CPT | Mod: GC

## 2021-05-29 PROCEDURE — 73501 X-RAY EXAM HIP UNI 1 VIEW: CPT | Mod: 26,LT

## 2021-05-29 PROCEDURE — 88311 DECALCIFY TISSUE: CPT | Mod: 26

## 2021-05-29 PROCEDURE — 88305 TISSUE EXAM BY PATHOLOGIST: CPT | Mod: 26

## 2021-05-29 RX ORDER — ENOXAPARIN SODIUM 100 MG/ML
40 INJECTION SUBCUTANEOUS DAILY
Refills: 0 | Status: DISCONTINUED | OUTPATIENT
Start: 2021-05-30 | End: 2021-06-01

## 2021-05-29 RX ORDER — METOCLOPRAMIDE HCL 10 MG
5 TABLET ORAL ONCE
Refills: 0 | Status: DISCONTINUED | OUTPATIENT
Start: 2021-05-29 | End: 2021-05-29

## 2021-05-29 RX ORDER — ONDANSETRON 8 MG/1
4 TABLET, FILM COATED ORAL EVERY 6 HOURS
Refills: 0 | Status: DISCONTINUED | OUTPATIENT
Start: 2021-05-29 | End: 2021-06-01

## 2021-05-29 RX ORDER — ACETAMINOPHEN 500 MG
650 TABLET ORAL EVERY 6 HOURS
Refills: 0 | Status: DISCONTINUED | OUTPATIENT
Start: 2021-05-29 | End: 2021-06-01

## 2021-05-29 RX ORDER — FOLIC ACID 0.8 MG
1 TABLET ORAL DAILY
Refills: 0 | Status: DISCONTINUED | OUTPATIENT
Start: 2021-05-29 | End: 2021-06-01

## 2021-05-29 RX ORDER — TRAMADOL HYDROCHLORIDE 50 MG/1
50 TABLET ORAL EVERY 6 HOURS
Refills: 0 | Status: DISCONTINUED | OUTPATIENT
Start: 2021-05-29 | End: 2021-05-29

## 2021-05-29 RX ORDER — ASCORBIC ACID 60 MG
500 TABLET,CHEWABLE ORAL
Refills: 0 | Status: DISCONTINUED | OUTPATIENT
Start: 2021-05-29 | End: 2021-06-01

## 2021-05-29 RX ORDER — HYDROMORPHONE HYDROCHLORIDE 2 MG/ML
0.5 INJECTION INTRAMUSCULAR; INTRAVENOUS; SUBCUTANEOUS ONCE
Refills: 0 | Status: DISCONTINUED | OUTPATIENT
Start: 2021-05-29 | End: 2021-05-29

## 2021-05-29 RX ORDER — OXYCODONE HYDROCHLORIDE 5 MG/1
5 TABLET ORAL EVERY 4 HOURS
Refills: 0 | Status: DISCONTINUED | OUTPATIENT
Start: 2021-05-29 | End: 2021-06-01

## 2021-05-29 RX ORDER — SODIUM CHLORIDE 9 MG/ML
1000 INJECTION, SOLUTION INTRAVENOUS
Refills: 0 | Status: DISCONTINUED | OUTPATIENT
Start: 2021-05-29 | End: 2021-05-29

## 2021-05-29 RX ORDER — OXYCODONE HYDROCHLORIDE 5 MG/1
5 TABLET ORAL
Refills: 0 | Status: DISCONTINUED | OUTPATIENT
Start: 2021-05-29 | End: 2021-05-29

## 2021-05-29 RX ORDER — CEFAZOLIN SODIUM 1 G
2000 VIAL (EA) INJECTION EVERY 8 HOURS
Refills: 0 | Status: COMPLETED | OUTPATIENT
Start: 2021-05-29 | End: 2021-05-30

## 2021-05-29 RX ORDER — HYDROMORPHONE HYDROCHLORIDE 2 MG/ML
0.5 INJECTION INTRAMUSCULAR; INTRAVENOUS; SUBCUTANEOUS
Refills: 0 | Status: DISCONTINUED | OUTPATIENT
Start: 2021-05-29 | End: 2021-05-29

## 2021-05-29 RX ORDER — OXYCODONE HYDROCHLORIDE 5 MG/1
10 TABLET ORAL
Refills: 0 | Status: DISCONTINUED | OUTPATIENT
Start: 2021-05-29 | End: 2021-05-29

## 2021-05-29 RX ORDER — ACETAMINOPHEN 500 MG
650 TABLET ORAL EVERY 6 HOURS
Refills: 0 | Status: DISCONTINUED | OUTPATIENT
Start: 2021-05-29 | End: 2021-05-29

## 2021-05-29 RX ORDER — AMLODIPINE BESYLATE 2.5 MG/1
2.5 TABLET ORAL DAILY
Refills: 0 | Status: DISCONTINUED | OUTPATIENT
Start: 2021-05-29 | End: 2021-05-29

## 2021-05-29 RX ORDER — POLYETHYLENE GLYCOL 3350 17 G/17G
17 POWDER, FOR SOLUTION ORAL AT BEDTIME
Refills: 0 | Status: DISCONTINUED | OUTPATIENT
Start: 2021-05-29 | End: 2021-06-01

## 2021-05-29 RX ORDER — PANTOPRAZOLE SODIUM 20 MG/1
40 TABLET, DELAYED RELEASE ORAL
Refills: 0 | Status: DISCONTINUED | OUTPATIENT
Start: 2021-05-29 | End: 2021-06-01

## 2021-05-29 RX ORDER — SENNA PLUS 8.6 MG/1
2 TABLET ORAL AT BEDTIME
Refills: 0 | Status: DISCONTINUED | OUTPATIENT
Start: 2021-05-29 | End: 2021-06-01

## 2021-05-29 RX ORDER — OXYCODONE HYDROCHLORIDE 5 MG/1
2.5 TABLET ORAL EVERY 4 HOURS
Refills: 0 | Status: DISCONTINUED | OUTPATIENT
Start: 2021-05-29 | End: 2021-06-01

## 2021-05-29 RX ORDER — SODIUM CHLORIDE 9 MG/ML
1000 INJECTION, SOLUTION INTRAVENOUS
Refills: 0 | Status: DISCONTINUED | OUTPATIENT
Start: 2021-05-29 | End: 2021-05-30

## 2021-05-29 RX ORDER — CEFAZOLIN SODIUM 1 G
2000 VIAL (EA) INJECTION EVERY 8 HOURS
Refills: 0 | Status: DISCONTINUED | OUTPATIENT
Start: 2021-05-29 | End: 2021-05-29

## 2021-05-29 RX ORDER — SODIUM CHLORIDE 9 MG/ML
1000 INJECTION, SOLUTION INTRAVENOUS ONCE
Refills: 0 | Status: DISCONTINUED | OUTPATIENT
Start: 2021-05-29 | End: 2021-05-29

## 2021-05-29 RX ORDER — ENOXAPARIN SODIUM 100 MG/ML
30 INJECTION SUBCUTANEOUS EVERY 12 HOURS
Refills: 0 | Status: DISCONTINUED | OUTPATIENT
Start: 2021-05-29 | End: 2021-05-29

## 2021-05-29 RX ORDER — BENZOCAINE AND MENTHOL 5; 1 G/100ML; G/100ML
1 LIQUID ORAL
Refills: 0 | Status: DISCONTINUED | OUTPATIENT
Start: 2021-05-29 | End: 2021-06-01

## 2021-05-29 RX ADMIN — SENNA PLUS 2 TABLET(S): 8.6 TABLET ORAL at 21:42

## 2021-05-29 RX ADMIN — HYDROMORPHONE HYDROCHLORIDE 0.5 MILLIGRAM(S): 2 INJECTION INTRAMUSCULAR; INTRAVENOUS; SUBCUTANEOUS at 18:05

## 2021-05-29 RX ADMIN — SODIUM CHLORIDE 75 MILLILITER(S): 9 INJECTION, SOLUTION INTRAVENOUS at 18:13

## 2021-05-29 RX ADMIN — SODIUM CHLORIDE 75 MILLILITER(S): 9 INJECTION, SOLUTION INTRAVENOUS at 02:26

## 2021-05-29 RX ADMIN — HYDROMORPHONE HYDROCHLORIDE 0.5 MILLIGRAM(S): 2 INJECTION INTRAMUSCULAR; INTRAVENOUS; SUBCUTANEOUS at 18:20

## 2021-05-29 RX ADMIN — PANTOPRAZOLE SODIUM 40 MILLIGRAM(S): 20 TABLET, DELAYED RELEASE ORAL at 05:47

## 2021-05-29 RX ADMIN — MOMETASONE FUROATE 1 PUFF(S): 220 INHALANT RESPIRATORY (INHALATION) at 05:47

## 2021-05-29 RX ADMIN — AMLODIPINE BESYLATE 2.5 MILLIGRAM(S): 2.5 TABLET ORAL at 05:46

## 2021-05-29 NOTE — PATIENT PROFILE ADULT - ARE SIGNIFICANT INDICATORS COMPLETE.
verbalizes understanding/demonstrates understanding of teaching/good return demonstration verbalizes understanding/needs met No Yes

## 2021-05-29 NOTE — PROGRESS NOTE ADULT - NUTRITIONAL ASSESSMENT
This patient has been assessed with a concern for Malnutrition and has been determined to have a diagnosis/diagnoses of Severe protein-calorie malnutrition.    This patient is being managed with:   Diet DASH/TLC-  Sodium & Cholesterol Restricted  Entered: May 28 2021  9:11PM    Diet NPO after Midnight-     NPO Start Date: 28-May-2021   NPO Start Time: 23:59  Except Medications  Entered: May 28 2021  6:45PM     This patient has been assessed with a concern for Malnutrition and has been determined to have a diagnosis/diagnoses of Severe protein-calorie malnutrition.    This patient is being managed with:   Diet DASH/TLC-  Sodium & Cholesterol Restricted  Entered: May 28 2021  9:11PM    Diet NPO after Midnight-     NPO Start Date: 28-May-2021   NPO Start Time: 23:59  Except Medications  Entered: May 28 2021  6:45PM

## 2021-05-29 NOTE — DIETITIAN INITIAL EVALUATION ADULT. - OTHER INFO
89 yo F PMHx Aortic Stenosis,  HTN, HLD, Asthma/COPD, GERD, Gout, presenting for Left hip pain on ambulating x  2 weeks. Admitted for reduction of nondisplaced left hip fracture.  Pt reports she lost ~40# over the past 3 years or so due to frequent illnesses and such. Each time she has a problem, ie. bronchitis, she loses a little wt and doesn't regain it.  Pt currently NPO since MN for possible hip procedure today.

## 2021-05-29 NOTE — DIETITIAN NUTRITION RISK NOTIFICATION - ADDITIONAL COMMENTS/DIETITIAN RECOMMENDATIONS
Pt also with 40#/ 28% wt loss over 3 years.  Recommend after procedure, advance diet to dash/tlc with 8oz ensure enlive bid. Discussed supplement use after discharge as well.

## 2021-05-29 NOTE — PATIENT PROFILE ADULT - ABILITY TO HEAR (WITH HEARING AID OR HEARING APPLIANCE IF NORMALLY USED):
wears hearing aid/Mildly to Moderately Impaired: difficulty hearing in some environments or speaker may need to increase volume or speak distinctly

## 2021-05-29 NOTE — CONSULT NOTE ADULT - ASSESSMENT
89F with nondisplaced impacted L femoral neck fracture diagnosed on outpatient MRI    Plan:  Medical management appreciated - please document medical clearance/optimization for OR  XR L Hip/Femur/Pelvis ordered  Preop CXR/COVID/Labs/T&S/EKG ordered   NWB/Strict bedrest   NPO after midnight for OR tomorrow; IVF while NPO  Hold DVT ppx after midnight for OR tomorrow; SCDs okay  Discussed plan for surgery tomorrow with patient and son at bedside. All questions answered to patient and son's satisfaction.   Plan for OR for CRPP with Dr. Marx tomorrow   Discussed with attending Dr. Marx who agrees with treatment plan
89F with known severe aortic valve stenosis with normal LVEF presents with LEFT hip fracture    Suggest:  1.  She is high risk for anesthesia with LEFT hip fracture repair  2.  Continue with Zocor  3. Pain mgmt  4. Continue with amlodipine.  Would increase to 5 mg if persistent elevated BP despite pain mgmt.  5. Will follow.

## 2021-05-29 NOTE — PROGRESS NOTE ADULT - ATTENDING COMMENTS
Patient seen and examined. States she feels well. Denies any pain at rest, only when she walks. Planned for OR today. RCRI score-0, Class 1 risk, however given patient's severe aortic stenosis, patient is high risk for urgent OR procedure.

## 2021-05-29 NOTE — PROGRESS NOTE ADULT - PROBLEM SELECTOR PLAN 3
- pt with c/o suprapubic pressure and urinary hesitancy today  - afebrile, mild leukocytosis noted  - bladder scan with 534cc residual - straight catch ordered  - UA with mod blood -- suspect 2/2 straight cath trauma  - f/u UCx  - repeat bladder scan in 8hrs to reassess

## 2021-05-29 NOTE — DIETITIAN INITIAL EVALUATION ADULT. - PROBLEM SELECTOR PLAN 2
- Per patient, Cardiologist Dr. Oliveira following aortic murmur with frequent TTEs to evaluate for TAVR  - Patient states she is has dyspnea on exertion when walking up stairs, no sob on flat surfaces  - Based on clinical evaluation Aortic stenosis is moderate to severe.  --TTE ordered for the AM  - Given severity of AS would defer medical clearance for surgery based on TTE and cardiologist evaluation in the AM  - Cardiology consulted, Dr. Marin

## 2021-05-29 NOTE — PROGRESS NOTE ADULT - SUBJECTIVE AND OBJECTIVE BOX
Orthopedics Post-op Check  POD 0 s/p L Hemiarthroplasty Hip    Patient seen and examined at bedside. Reporting pain her hip but overall controlled with medications. No cp, sob, nausea or vomiting.    Vital Signs Last 24 Hrs  T(C): 36.8 (05-29-21 @ 17:53), Max: 37.1 (05-29-21 @ 02:00)  T(F): 98.2 (05-29-21 @ 17:53), Max: 98.7 (05-29-21 @ 02:00)  HR: 91 (05-29-21 @ 17:56) (70 - 91)  BP: 163/57 (05-29-21 @ 17:56) (142/62 - 178/76)  BP(mean): --  RR: 19 (05-29-21 @ 17:56) (18 - 19)  SpO2: 100% (05-29-21 @ 17:56) (92% - 100%)                        11.6   13.60 )-----------( 308      ( 29 May 2021 06:17 )             35.9     29 May 2021 06:17    143    |  105    |  15     ----------------------------<  94     3.6     |  32     |  0.69     Ca    9.0        29 May 2021 06:17      PT/INR - ( 29 May 2021 06:17 )   PT: 13.0 sec;   INR: 1.12 ratio         PTT - ( 29 May 2021 06:17 )  PTT:28.1 sec    Exam:  Gen: NAD, resting comfortably  Dressing c/d/i  +EHL/FHL/TA/GS  SILT L2-S1  +DP/PT 2+  Calf NTTP b/l  Compartments soft and compressible    A/P:  89yFemale Stable POD 0  s/p L Hip Hemiarthroplasty    -Medical and cardiology management appreciated  -Home meds continued  -FU AM labs  -WBAT  -Pain control PRN  -PT/OT  -Ppx ABX [id number]  -DVT PE ppx: Lovenox - hold until POD1  -Incentive spirometry  -Dispo pending PT evaluation  -Will discuss with attending Dr. Marx and advise if any changes to plan

## 2021-05-29 NOTE — DIETITIAN NUTRITION RISK NOTIFICATION - TREATMENT: THE FOLLOWING DIET HAS BEEN RECOMMENDED
Diet, DASH/TLC:   Sodium & Cholesterol Restricted (05-28-21 @ 21:12) [Active]  Diet, NPO after Midnight:      NPO Start Date: 28-May-2021,   NPO Start Time: 23:59  Except Medications (05-28-21 @ 18:49) [Active]

## 2021-05-29 NOTE — PROGRESS NOTE ADULT - SUBJECTIVE AND OBJECTIVE BOX
Patient is a 89y old  Female who presents with a chief complaint of Closed Reduction of Left Hip Fracture (29 May 2021 10:53)      INTERVAL HPI/OVERNIGHT EVENTS: Patient seen and examined at bedside. No acute events overnight. Today, pt states her pain in controlled. She is able to move all 4 extremities. Admits to tender left elbow when it is touched. States she feels lower abd pressure and feels like she is not fully voiding.    MEDICATIONS  (STANDING):  allopurinol 100 milliGRAM(s) Oral two times a day  amLODIPine   Tablet 2.5 milliGRAM(s) Oral daily  lactated ringers. 1000 milliLiter(s) (75 mL/Hr) IV Continuous <Continuous>  mometasone 220 MICROgram(s) Inhaler 1 Puff(s) Inhalation daily  multivitamin/minerals 1 Tablet(s) Oral daily  omega-3-Acid Ethyl Esters 2 Gram(s) Oral two times a day  pantoprazole    Tablet 40 milliGRAM(s) Oral before breakfast  simvastatin 20 milliGRAM(s) Oral at bedtime    MEDICATIONS  (PRN):  ALBUTerol    90 MICROgram(s) HFA Inhaler 2 Puff(s) Inhalation every 6 hours PRN Shortness of Breath and/or Wheezing      Allergies    No Known Allergies    Intolerances        REVIEW OF SYSTEMS:  CONSTITUTIONAL: No fever or chills  HEENT:  No headache, no sore throat  RESPIRATORY: No cough, wheezing, or shortness of breath  CARDIOVASCULAR: No chest pain, palpitations  GASTROINTESTINAL: admits lower abd pressure, No nausea, vomiting, or diarrhea  GENITOURINARY: No dysuria, frequency, or hematuria; admits hesitancy   NEUROLOGICAL: no focal weakness or dizziness  MUSCULOSKELETAL: admits left elbow pain with palpation    Vital Signs Last 24 Hrs  T(C): 36.5 (29 May 2021 05:30), Max: 37.1 (29 May 2021 02:00)  T(F): 97.7 (29 May 2021 05:30), Max: 98.7 (29 May 2021 02:00)  HR: 70 (29 May 2021 05:30) (70 - 78)  BP: 142/62 (29 May 2021 05:30) (142/62 - 178/76)  BP(mean): --  RR: 18 (29 May 2021 05:30) (18 - 19)  SpO2: 93% (29 May 2021 05:30) (93% - 97%)    PHYSICAL EXAM:  GENERAL: NAD, elderly  HEENT:  anicteric, moist mucous membranes  CHEST/LUNG:  CTA b/l, no rales, wheezes, or rhonchi  HEART:  RRR, S1, S2, crescendo-decrescendo murmur   ABDOMEN:  BS+, soft, nontender, nondistended  EXTREMITIES: +hematoma left elbow(TTP); no cyanosis, or calf tenderness  NERVOUS SYSTEM: answers questions and follows commands appropriately    LABS:                        11.6   13.60 )-----------( 308      ( 29 May 2021 06:17 )             35.9     CBC Full  -  ( 29 May 2021 06:17 )  WBC Count : 13.60 K/uL  Hemoglobin : 11.6 g/dL  Hematocrit : 35.9 %  Platelet Count - Automated : 308 K/uL  Mean Cell Volume : 90.0 fl  Mean Cell Hemoglobin : 29.1 pg  Mean Cell Hemoglobin Concentration : 32.3 gm/dL  Auto Neutrophil # : x  Auto Lymphocyte # : x  Auto Monocyte # : x  Auto Eosinophil # : x  Auto Basophil # : x  Auto Neutrophil % : x  Auto Lymphocyte % : x  Auto Monocyte % : x  Auto Eosinophil % : x  Auto Basophil % : x    29 May 2021 06:17    143    |  105    |  15     ----------------------------<  94     3.6     |  32     |  0.69     Ca    9.0        29 May 2021 06:17      PT/INR - ( 29 May 2021 06:17 )   PT: 13.0 sec;   INR: 1.12 ratio         PTT - ( 29 May 2021 06:17 )  PTT:28.1 sec  Urinalysis Basic - ( 29 May 2021 08:53 )    Color: Yellow / Appearance: Clear / S.010 / pH: x  Gluc: x / Ketone: Negative  / Bili: Negative / Urobili: Negative   Blood: x / Protein: 30 mg/dL / Nitrite: Negative   Leuk Esterase: Negative / RBC: 6-10 /HPF / WBC 0-2   Sq Epi: x / Non Sq Epi: Negative / Bacteria: x      CAPILLARY BLOOD GLUCOSE

## 2021-05-29 NOTE — PROGRESS NOTE ADULT - PROBLEM SELECTOR PLAN 1
- Nondisplaced Left hip fracture Evaluated by Orthopedist group Dr. Marx  - Non weight bearing per orthopedics  - Patient reports she does not have pain at rest  - DASH diet, NPO after midnight for surgery today  - Cardio eval noted. Given history of AS patient is high risk for a moderate risk orthopedic procedure. Patient is optimized as best as possible to proceed with planned procedure with routine hemodynamic monitoring. - Nondisplaced Left hip fracture Evaluated by Orthopedist group Dr. Marx  - Non weight bearing per orthopedics  - Patient reports she does not have pain at rest  - DASH diet, NPO after midnight for surgery today  - Cardio eval noted. Given history of AS patient is high risk for an intermediate risk orthopedic procedure. Patient is optimized as best as possible to proceed with planned procedure with routine hemodynamic monitoring.

## 2021-05-29 NOTE — DIETITIAN INITIAL EVALUATION ADULT. - ORAL INTAKE PTA/DIET HISTORY
Pt reports that PTA, she was still doing everything herself including driving and food shopping.  NKFA, denies problems chewing or swallowing although doesn't have salads.

## 2021-05-29 NOTE — PROGRESS NOTE ADULT - SUBJECTIVE AND OBJECTIVE BOX
Orthopedics     Patient seen and examined at bedside, resting comfortably. No acute events overnight. Pain adequately controlled. Patient feeling well. Denies CP/SOB. No nausea or vomiting. No other acute complaints at this time. Plan for OR pending clearances.    Vital Signs Last 24 Hrs  T(C): 36.5 (05-29-21 @ 05:30), Max: 37.1 (05-29-21 @ 02:00)  T(F): 97.7 (05-29-21 @ 05:30), Max: 98.7 (05-29-21 @ 02:00)  HR: 70 (05-29-21 @ 05:30) (70 - 78)  BP: 142/62 (05-29-21 @ 05:30) (142/62 - 178/76)  BP(mean): --  RR: 18 (05-29-21 @ 05:30) (18 - 19)  SpO2: 93% (05-29-21 @ 05:30) (93% - 97%)                        11.6   13.60 )-----------( 308      ( 29 May 2021 06:17 )             35.9     29 May 2021 06:17    143    |  105    |  15     ----------------------------<  94     3.6     |  32     |  0.69     Ca    9.0        29 May 2021 06:17  PT/INR - ( 29 May 2021 06:17 )   PT: 13.0 sec;   INR: 1.12 ratio    PTT - ( 29 May 2021 06:17 )  PTT:28.1 sec    Exam:  Gen: NAD, Awake and alert  LLE  Skin intact  Approx 1-2cm short compared to contralateral side  +EHL/FHL/TA/GS  SILT L2-S1  +DP  Calf Soft NT  Compartments soft and compressible

## 2021-05-29 NOTE — DIETITIAN INITIAL EVALUATION ADULT. - PHYSICAL ASSESSMENT TEMPLES
You can access the FollowMyHealth Patient Portal offered by [facility] by registering at the following website: [email or web address]. By joining [employer]’s FollowMyHealth portal, you will also be able to view your health information using other applications (apps) compatible with our system. mild

## 2021-05-29 NOTE — PROGRESS NOTE ADULT - ASSESSMENT
A/P:  Patient is a 89y Female L hip fx plan for OR pending clearances    -Medical management per primary team - please document on clearance/optimization status for OR  -Cardiology recs appreciated - please document on clearance/optimization status for OR  -NPO except meds  -IVF while NPO  -Hold chem dvt ppx  -FU am labs  -FU TTE  -Ice/Elevate  -Incentive Spirometry  -Multimodal Analgesia  -SCDs  -PT/OT OOB  -NWB LLE, strict bedrest  -Will discuss w/ attending and advise if plan changes

## 2021-05-29 NOTE — CONSULT NOTE ADULT - SUBJECTIVE AND OBJECTIVE BOX
[facility] Cardiology Consult - Ngoc Marin Augustin Shelby [phone]    CHIEF COMPLAINT: Patient is a 89y old  Female who presents with a chief complaint of Closed Reduction of Left Hip Fracture (28 May 2021 20:13)      HPI:  89 yo F PMHx Aortic Stenosis,  HTN, HLD, Asthma/COPD, GERD, Gout, presenting for Left hip pain on ambulating x  2 weeks.  Patient reports that she was instructed to come to the ED today after MRI revealed nondisplaced left hip fracture. Patient reports that she has been ambulating with pain since the fall using a walker. For the pain she has been taking Ecotin 325 two pills a day for pain relief for the hip. She states the hematoma on her elbow is painful only when touched.  Patient reports she has a known history of aortic valve disorder and her Cardiologist Dr. Oliveira is closely following her with TTE to determine if she is a candidate for valve replacement.  Patient states can walk distances on flat surfaces without shortness of breath but does have TITUS on hills or stairs. She states she took all her medications today prior to coming to the hospital [facility].  Currently denies CP, SOB, palpitations, dizziness, headache, nausea, or pain.       ED Course: Patient did not receive any treatments in the ED prior to admission  VS T 97.7 HR 70 /71 RR 18 SpO2 96% on RA    Labs significant for WBC 11.9, Hg 10.5   EKG ; NSR with infrequent PVC and RBBB    (28 May 2021 20:13)      PAST MEDICAL & SURGICAL HISTORY:  Hypertension    Asthma    Hyperlipidemia    Lumbar back pain  Pinched nerve    Fractured hip    No significant past surgical history        SOCIAL HISTORY: Alochol: Denied  Smoking: Nonsmoker  Drug Use: Denied  Marital Status:         FAMILY HISTORY: FAMILY HISTORY:      MEDICATIONS  (STANDING):  allopurinol 100 milliGRAM(s) Oral two times a day  amLODIPine   Tablet 2.5 milliGRAM(s) Oral daily  lactated ringers. 1000 milliLiter(s) (75 mL/Hr) IV Continuous <Continuous>  mometasone 220 MICROgram(s) Inhaler 1 Puff(s) Inhalation daily  multivitamin/minerals 1 Tablet(s) Oral daily  omega-3-Acid Ethyl Esters 2 Gram(s) Oral two times a day  pantoprazole    Tablet 40 milliGRAM(s) Oral before breakfast  simvastatin 20 milliGRAM(s) Oral at bedtime    MEDICATIONS  (PRN):  ALBUTerol    90 MICROgram(s) HFA Inhaler 2 Puff(s) Inhalation every 6 hours PRN Shortness of Breath and/or Wheezing      Allergies    No Known Allergies    Intolerances        REVIEW OF SYSTEMS:  CONSTITUTIONAL: No weakness, fevers or chills  EYES/ENT: No visual changes;  No vertigo or throat pain   NECK: No pain or stiffness  RESPIRATORY: No cough, wheezing, hemoptysis; No shortness of breath  CARDIOVASCULAR: No chest pain or palpitations  GASTROINTESTINAL: No abdominal pain. No nausea, vomiting, or hematemesis; No diarrhea or constipation. No melena or hematochezia.  GENITOURINARY: No dysuria, frequency or hematuria  NEUROLOGICAL: No numbness or weakness  SKIN: No itching or rash  All other review of systems is negative unless indicated above    VITAL SIGNS:   Vital Signs Last 24 Hrs  T(C): 36.5 (29 May 2021 05:30), Max: 37.1 (29 May 2021 02:00)  T(F): 97.7 (29 May 2021 05:30), Max: 98.7 (29 May 2021 02:00)  HR: 70 (29 May 2021 05:30) (70 - 78)  BP: 142/62 (29 May 2021 05:30) (142/62 - 178/76)  BP(mean): --  RR: 18 (29 May 2021 05:30) (18 - 19)  SpO2: 93% (29 May 2021 05:30) (93% - 97%)    I&O's Summary    28 May 2021 07:01  -  29 May 2021 07:00  --------------------------------------------------------  IN: 375 mL / OUT: 550 mL / NET: -175 mL        PHYSICAL EXAM:  Constitutional: Awake in NAD  HEENT:  LISA, EOMI  Neck: No JVD  Pulmonary: CTA B/L No R/R/W  Cardiovascular: PMI not palpable non-displaced Regular S1 and S2, no murmurs, rubs, gallops or clicks  Gastrointestinal: Bowel Sounds present, soft, nontender.   Extremities: Trace peripheral edema.   Neuro: No gross focal deficits    LABS: All Labs Reviewed:                        11.6   13.60 )-----------( 308      ( 29 May 2021 06:17 )             35.9                         10.5   12.99 )-----------( 287      ( 28 May 2021 19:45 )             32.3     29 May 2021 06:17    143    |  105    |  15     ----------------------------<  94     3.6     |  32     |  0.69   28 May 2021 19:45    143    |  108    |  23     ----------------------------<  113    3.6     |  29     |  0.80     Ca    9.0        29 May 2021 06:17  Ca    8.8        28 May 2021 19:45      PT/INR - ( 29 May 2021 06:17 )   PT: 13.0 sec;   INR: 1.12 ratio         PTT - ( 29 May 2021 06:17 )  PTT:28.1 sec      Blood Culture:         RADIOLOGY/EKG:     [facility] Cardiology Consult - Ngoc Marin Augustin Shelby [phone]    CHIEF COMPLAINT: Patient is a 89y old  Female who presents with a chief complaint of Closed Reduction of Left Hip Fracture (28 May 2021 20:13)      HPI:  89 yo F PMHx Aortic Stenosis,  HTN, HLD, Asthma/COPD, GERD, Gout, presenting for Left hip pain on ambulating x  2 weeks.  Patient reports that she was instructed to come to the ED today after MRI revealed nondisplaced left hip fracture. Patient reports that she has been ambulating with pain since the fall using a walker. For the pain she has been taking Ecotin 325 two pills a day for pain relief for the hip. She states the hematoma on her elbow is painful only when touched.  Patient reports she has a known history of aortic valve disorder and her Cardiologist Dr. Oliveira is closely following her with TTE to determine if she is a candidate for valve replacement.  Patient states can walk distances on flat surfaces without shortness of breath but does have TITUS on hills or stairs. She states she took all her medications today prior to coming to the hospital [facility].  Currently denies CP, SOB, palpitations, dizziness, headache, nausea, or pain.       ED Course: Patient did not receive any treatments in the ED prior to admission  VS T 97.7 HR 70 /71 RR 18 SpO2 96% on RA    Labs significant for WBC 11.9, Hg 10.5   EKG ; NSR with infrequent PVC and RBBB    (28 May 2021 20:13)      PAST MEDICAL & SURGICAL HISTORY:  Hypertension    Asthma    Hyperlipidemia    Lumbar back pain  Pinched nerve    Fractured hip    No significant past surgical history        SOCIAL HISTORY: Alochol: Denied  Smoking: Nonsmoker  Drug Use: Denied  Marital Status:         FAMILY HISTORY: FAMILY HISTORY:      MEDICATIONS  (STANDING):  allopurinol 100 milliGRAM(s) Oral two times a day  amLODIPine   Tablet 2.5 milliGRAM(s) Oral daily  lactated ringers. 1000 milliLiter(s) (75 mL/Hr) IV Continuous <Continuous>  mometasone 220 MICROgram(s) Inhaler 1 Puff(s) Inhalation daily  multivitamin/minerals 1 Tablet(s) Oral daily  omega-3-Acid Ethyl Esters 2 Gram(s) Oral two times a day  pantoprazole    Tablet 40 milliGRAM(s) Oral before breakfast  simvastatin 20 milliGRAM(s) Oral at bedtime    MEDICATIONS  (PRN):  ALBUTerol    90 MICROgram(s) HFA Inhaler 2 Puff(s) Inhalation every 6 hours PRN Shortness of Breath and/or Wheezing      Allergies    No Known Allergies    Intolerances        REVIEW OF SYSTEMS:  CONSTITUTIONAL: No weakness, fevers or chills  EYES/ENT: No visual changes;  No vertigo or throat pain   NECK: No pain or stiffness  RESPIRATORY: No cough, wheezing, hemoptysis; No shortness of breath  CARDIOVASCULAR: No chest pain or palpitations  GASTROINTESTINAL: No abdominal pain. No nausea, vomiting, or hematemesis; No diarrhea or constipation. No melena or hematochezia.  GENITOURINARY: No dysuria, frequency or hematuria  NEUROLOGICAL: No numbness or weakness  SKIN: No itching or rash  All other review of systems is negative unless indicated above    VITAL SIGNS:   Vital Signs Last 24 Hrs  T(C): 36.5 (29 May 2021 05:30), Max: 37.1 (29 May 2021 02:00)  T(F): 97.7 (29 May 2021 05:30), Max: 98.7 (29 May 2021 02:00)  HR: 70 (29 May 2021 05:30) (70 - 78)  BP: 142/62 (29 May 2021 05:30) (142/62 - 178/76)  BP(mean): --  RR: 18 (29 May 2021 05:30) (18 - 19)  SpO2: 93% (29 May 2021 05:30) (93% - 97%)    I&O's Summary    28 May 2021 07:01  -  29 May 2021 07:00  --------------------------------------------------------  IN: 375 mL / OUT: 550 mL / NET: -175 mL        PHYSICAL EXAM:  Constitutional: Awake in NAD  HEENT:  LISA, EOMI  Neck: No JVD  Pulmonary: CTA B/L No R/R/W  Cardiovascular: PMI not palpable non-displaced Regular S1 and S2, MICHAEL 3/6  Gastrointestinal: Bowel Sounds present, soft, nontender.   Extremities: Trace peripheral edema.   Neuro: No gross focal deficits    LABS: All Labs Reviewed:                        11.6   13.60 )-----------( 308      ( 29 May 2021 06:17 )             35.9                         10.5   12.99 )-----------( 287      ( 28 May 2021 19:45 )             32.3     29 May 2021 06:17    143    |  105    |  15     ----------------------------<  94     3.6     |  32     |  0.69   28 May 2021 19:45    143    |  108    |  23     ----------------------------<  113    3.6     |  29     |  0.80     Ca    9.0        29 May 2021 06:17  Ca    8.8        28 May 2021 19:45      PT/INR - ( 29 May 2021 06:17 )   PT: 13.0 sec;   INR: 1.12 ratio         PTT - ( 29 May 2021 06:17 )  PTT:28.1 sec      Blood Culture:         RADIOLOGY/EKG:  < from: TTE Echo Complete w/o Contrast w/ Doppler (05.29.21 @ 08:06) >  Impression:  1. This a very technically limited study.  2. There is normal left ventricular size and normal left ventricular systolic function with an ejection fraction of 65%.  3. There is diastolic dysfunction.  4. There is mild left ventricular hypertrophy.  5. There is a trileaflet aortic valve with significant aortic valve calcification with severe aortic valve stenosis. Her mean aortic valve gradient of 64 mmHg and peak aortic valve gradient of 99 mmHg. The aortic valve area was calculated to be 0.72 sq cm. The peak aortic valve velocity is 5 m/s  6. There is mild aortic insufficiency.  7. There is significant mitral annular calcification with moderate mitral regurgitation.  8. There is trivial tricuspid regurgitation.  9. The pulmonic valve was not well visualized on this study.  10. No significant pericardial effusion.  11. There is normal right atrial and right ventricular size and systolic function.      < end of copied text >

## 2021-05-29 NOTE — PROGRESS NOTE ADULT - ASSESSMENT
89 yo F PMHx Aortic Stenosis,  HTN, HLD, Asthma/COPD, GERD, Gout, presenting for Left hip pain on ambulating x  2 weeks. Admitted for reduction of nondisplaced left hip fracture.

## 2021-05-30 LAB
ANION GAP SERPL CALC-SCNC: 7 MMOL/L — SIGNIFICANT CHANGE UP (ref 5–17)
BASOPHILS # BLD AUTO: 0.03 K/UL — SIGNIFICANT CHANGE UP (ref 0–0.2)
BASOPHILS NFR BLD AUTO: 0.2 % — SIGNIFICANT CHANGE UP (ref 0–2)
BUN SERPL-MCNC: 15 MG/DL — SIGNIFICANT CHANGE UP (ref 7–23)
CALCIUM SERPL-MCNC: 8.2 MG/DL — LOW (ref 8.5–10.1)
CHLORIDE SERPL-SCNC: 101 MMOL/L — SIGNIFICANT CHANGE UP (ref 96–108)
CO2 SERPL-SCNC: 31 MMOL/L — SIGNIFICANT CHANGE UP (ref 22–31)
CREAT SERPL-MCNC: 0.71 MG/DL — SIGNIFICANT CHANGE UP (ref 0.5–1.3)
CULTURE RESULTS: NO GROWTH — SIGNIFICANT CHANGE UP
EOSINOPHIL # BLD AUTO: 0 K/UL — SIGNIFICANT CHANGE UP (ref 0–0.5)
EOSINOPHIL NFR BLD AUTO: 0 % — SIGNIFICANT CHANGE UP (ref 0–6)
GLUCOSE SERPL-MCNC: 128 MG/DL — HIGH (ref 70–99)
HCT VFR BLD CALC: 30.3 % — LOW (ref 34.5–45)
HGB BLD-MCNC: 9.8 G/DL — LOW (ref 11.5–15.5)
IMM GRANULOCYTES NFR BLD AUTO: 0.5 % — SIGNIFICANT CHANGE UP (ref 0–1.5)
LYMPHOCYTES # BLD AUTO: 1.26 K/UL — SIGNIFICANT CHANGE UP (ref 1–3.3)
LYMPHOCYTES # BLD AUTO: 7.9 % — LOW (ref 13–44)
MCHC RBC-ENTMCNC: 29.5 PG — SIGNIFICANT CHANGE UP (ref 27–34)
MCHC RBC-ENTMCNC: 32.3 GM/DL — SIGNIFICANT CHANGE UP (ref 32–36)
MCV RBC AUTO: 91.3 FL — SIGNIFICANT CHANGE UP (ref 80–100)
MONOCYTES # BLD AUTO: 1.02 K/UL — HIGH (ref 0–0.9)
MONOCYTES NFR BLD AUTO: 6.4 % — SIGNIFICANT CHANGE UP (ref 2–14)
NEUTROPHILS # BLD AUTO: 13.61 K/UL — HIGH (ref 1.8–7.4)
NEUTROPHILS NFR BLD AUTO: 85 % — HIGH (ref 43–77)
NRBC # BLD: 0 /100 WBCS — SIGNIFICANT CHANGE UP (ref 0–0)
PLATELET # BLD AUTO: 265 K/UL — SIGNIFICANT CHANGE UP (ref 150–400)
POTASSIUM SERPL-MCNC: 4.4 MMOL/L — SIGNIFICANT CHANGE UP (ref 3.5–5.3)
POTASSIUM SERPL-SCNC: 4.4 MMOL/L — SIGNIFICANT CHANGE UP (ref 3.5–5.3)
RBC # BLD: 3.32 M/UL — LOW (ref 3.8–5.2)
RBC # FLD: 13.7 % — SIGNIFICANT CHANGE UP (ref 10.3–14.5)
SODIUM SERPL-SCNC: 139 MMOL/L — SIGNIFICANT CHANGE UP (ref 135–145)
SPECIMEN SOURCE: SIGNIFICANT CHANGE UP
WBC # BLD: 16 K/UL — HIGH (ref 3.8–10.5)
WBC # FLD AUTO: 16 K/UL — HIGH (ref 3.8–10.5)

## 2021-05-30 PROCEDURE — 99232 SBSQ HOSP IP/OBS MODERATE 35: CPT | Mod: GC

## 2021-05-30 RX ORDER — MOMETASONE FUROATE 220 UG/1
1 INHALANT RESPIRATORY (INHALATION) DAILY
Refills: 0 | Status: DISCONTINUED | OUTPATIENT
Start: 2021-05-30 | End: 2021-06-01

## 2021-05-30 RX ORDER — ALBUTEROL 90 UG/1
2 AEROSOL, METERED ORAL EVERY 6 HOURS
Refills: 0 | Status: DISCONTINUED | OUTPATIENT
Start: 2021-05-30 | End: 2021-06-01

## 2021-05-30 RX ORDER — ALLOPURINOL 300 MG
100 TABLET ORAL
Refills: 0 | Status: DISCONTINUED | OUTPATIENT
Start: 2021-05-30 | End: 2021-06-01

## 2021-05-30 RX ORDER — AMLODIPINE BESYLATE 2.5 MG/1
2.5 TABLET ORAL DAILY
Refills: 0 | Status: DISCONTINUED | OUTPATIENT
Start: 2021-05-30 | End: 2021-06-01

## 2021-05-30 RX ORDER — SIMVASTATIN 20 MG/1
20 TABLET, FILM COATED ORAL AT BEDTIME
Refills: 0 | Status: DISCONTINUED | OUTPATIENT
Start: 2021-05-30 | End: 2021-06-01

## 2021-05-30 RX ADMIN — POLYETHYLENE GLYCOL 3350 17 GRAM(S): 17 POWDER, FOR SOLUTION ORAL at 21:50

## 2021-05-30 RX ADMIN — Medication 1 MILLIGRAM(S): at 11:29

## 2021-05-30 RX ADMIN — SIMVASTATIN 20 MILLIGRAM(S): 20 TABLET, FILM COATED ORAL at 21:51

## 2021-05-30 RX ADMIN — OXYCODONE HYDROCHLORIDE 5 MILLIGRAM(S): 5 TABLET ORAL at 10:11

## 2021-05-30 RX ADMIN — SENNA PLUS 2 TABLET(S): 8.6 TABLET ORAL at 21:51

## 2021-05-30 RX ADMIN — OXYCODONE HYDROCHLORIDE 5 MILLIGRAM(S): 5 TABLET ORAL at 11:11

## 2021-05-30 RX ADMIN — PANTOPRAZOLE SODIUM 40 MILLIGRAM(S): 20 TABLET, DELAYED RELEASE ORAL at 05:20

## 2021-05-30 RX ADMIN — Medication 1 TABLET(S): at 11:29

## 2021-05-30 RX ADMIN — Medication 500 MILLIGRAM(S): at 05:19

## 2021-05-30 RX ADMIN — Medication 100 MILLIGRAM(S): at 00:42

## 2021-05-30 RX ADMIN — ENOXAPARIN SODIUM 40 MILLIGRAM(S): 100 INJECTION SUBCUTANEOUS at 11:29

## 2021-05-30 RX ADMIN — Medication 100 MILLIGRAM(S): at 17:07

## 2021-05-30 RX ADMIN — Medication 500 MILLIGRAM(S): at 17:07

## 2021-05-30 RX ADMIN — Medication 100 MILLIGRAM(S): at 07:18

## 2021-05-30 NOTE — PROGRESS NOTE ADULT - SUBJECTIVE AND OBJECTIVE BOX
[facility] Cardiology Progress Note [phone] (Dr. Marin, Ngoc, Heron, Augustin)    CHIEF COMPLAINT: Patient is a 89y old  Female who presents with a chief complaint of Closed Reduction of Left Hip Fracture (29 May 2021 18:04)      Follow Up Today: The patient denies any chest discomfort or shortness of breath.    HPI:  89 yo F PMHx Aortic Stenosis,  HTN, HLD, Asthma/COPD, GERD, Gout, presenting for Left hip pain on ambulating x  2 weeks.  Patient reports that she was instructed to come to the ED today after MRI revealed nondisplaced left hip fracture. Patient reports that she has been ambulating with pain since the fall using a walker. For the pain she has been taking Ecotin 325 two pills a day for pain relief for the hip. She states the hematoma on her elbow is painful only when touched.  Patient reports she has a known history of aortic valve disorder and her Cardiologist Dr. Oliveira is closely following her with TTE to determine if she is a candidate for valve replacement.  Patient states can walk distances on flat surfaces without shortness of breath but does have TITUS on hills or stairs. She states she took all her medications today prior to coming to the hospital tonight.  Currently denies CP, SOB, palpitations, dizziness, headache, nausea, or pain.       ED Course: Patient did not receive any treatments in the ED prior to admission  VS T 97.7 HR 70 /71 RR 18 SpO2 96% on RA    Labs significant for WBC 11.9, Hg 10.5   EKG ; NSR with infrequent PVC and RBBB    (28 May 2021 20:13)      PAST MEDICAL & SURGICAL HISTORY:  Hypertension    Asthma    Hyperlipidemia    Lumbar back pain  Pinched nerve    Fractured hip    No significant past surgical history        MEDICATIONS  (STANDING):  ascorbic acid 500 milliGRAM(s) Oral two times a day  enoxaparin Injectable 40 milliGRAM(s) SubCutaneous daily  folic acid 1 milliGRAM(s) Oral daily  lactated ringers. 1000 milliLiter(s) (75 mL/Hr) IV Continuous <Continuous>  multivitamin 1 Tablet(s) Oral daily  pantoprazole    Tablet 40 milliGRAM(s) Oral before breakfast  polyethylene glycol 3350 17 Gram(s) Oral at bedtime  senna 2 Tablet(s) Oral at bedtime    MEDICATIONS  (PRN):  acetaminophen   Tablet .. 650 milliGRAM(s) Oral every 6 hours PRN Temp greater or equal to 38C (100.4F), Mild Pain (1 - 3)  benzocaine 15 mG/menthol 3.6 mG (Sugar-Free) Lozenge 1 Lozenge Oral every 2 hours PRN Sore Throat  ondansetron Injectable 4 milliGRAM(s) IV Push every 6 hours PRN Nausea and/or Vomiting  oxyCODONE    IR 2.5 milliGRAM(s) Oral every 4 hours PRN Moderate Pain (4 - 6)  oxyCODONE    IR 5 milliGRAM(s) Oral every 4 hours PRN Severe Pain (7 - 10)      Allergies    No Known Allergies    Intolerances        REVIEW OF SYSTEMS:    All other review of systems is negative unless indicated above    Vital Signs Last 24 Hrs  T(C): 36.6 (30 May 2021 05:06), Max: 36.8 (29 May 2021 17:53)  T(F): 97.9 (30 May 2021 05:06), Max: 98.2 (29 May 2021 17:53)  HR: 72 (30 May 2021 05:06) (72 - 91)  BP: 132/64 (30 May 2021 05:06) (110/57 - 175/71)  BP(mean): --  RR: 17 (30 May 2021 05:06) (12 - 19)  SpO2: 95% (30 May 2021 05:06) (92% - 100%)    I&O's Summary    29 May 2021 07:01  -  30 May 2021 07:00  --------------------------------------------------------  IN: 480 mL / OUT: 1000 mL / NET: -520 mL        PHYSICAL EXAM:    Constitutional: NAD, awake and alert, well-developed  Eyes:  EOMI,  Pupils round, No oral cyanosis.  HEENT: No exudate or erythema  Pulmonary: Non-labored, breath sounds are clear bilaterally, No wheezing, rales or rhonchi  Cardiovascular: Regular, S1 and S2, No murmurs, rubs, gallops oir clicks  Gastrointestinal: Bowel Sounds present, soft, nontender.   Ext: No significant LE edema with good pulses x 4  Neurological: Alert, no gross focal motor deficits  Skin: No rashes.  Psych:  Mood & affect appropriate    LABS: All Labs Reviewed:                        9.8    16.00 )-----------( 265      ( 30 May 2021 05:19 )             30.3                         10.2   17.79 )-----------( 286      ( 29 May 2021 18:03 )             31.6                         11.6   13.60 )-----------( 308      ( 29 May 2021 06:17 )             35.9     30 May 2021 05:19    139    |  101    |  15     ----------------------------<  128    4.4     |  31     |  0.71   29 May 2021 18:03    140    |  105    |  14     ----------------------------<  152    4.0     |  29     |  0.75   29 May 2021 06:17    143    |  105    |  15     ----------------------------<  94     3.6     |  32     |  0.69     Ca    8.2        30 May 2021 05:19  Ca    8.3        29 May 2021 18:03  Ca    9.0        29 May 2021 06:17      PT/INR - ( 29 May 2021 06:17 )   PT: 13.0 sec;   INR: 1.12 ratio         PTT - ( 29 May 2021 06:17 )  PTT:28.1 sec      Blood Culture:         RADIOLOGY/EKG:  < from: TTE Echo Complete w/o Contrast w/ Doppler (05.29.21 @ 08:06) >  Impression:  1. This a very technically limited study.  2. There is normal left ventricular size and normal left ventricular systolic function with an ejection fraction of 65%.  3. There is diastolic dysfunction.  4. There is mild left ventricular hypertrophy.  5. There is a trileaflet aortic valve with significant aortic valve calcification with severe aortic valve stenosis. Her mean aortic valve gradient of 64 mmHg and peak aortic valve gradient of 99 mmHg. The aortic valve area was calculated to be 0.72 sq cm. The peak aortic valve velocity is 5 m/s  6. There is mild aortic insufficiency.  7. There is significant mitral annular calcification with moderate mitral regurgitation.  8. There is trivial tricuspid regurgitation.  9. The pulmonic valve was not well visualized on this study.  10. No significant pericardial effusion.  11. There is normal right atrial and right ventricular size and systolic function.  Attending Attestation:   20 minutes spent on total encounter; more than 50% of the visit was spent counseling and/or coordinating care by the attending physician.     ASSESSMENT AND PLAN [facility] Cardiology Progress Note [phone] (Dr. Marin, Ngoc, Heron, Augustin)    CHIEF COMPLAINT: Patient is a 89y old  Female who presents with a chief complaint of Closed Reduction of Left Hip Fracture (29 May 2021 18:04)      Follow Up Today: The patient denies any chest discomfort or shortness of breath. She has mild hip pain with movement.  No events on tele monitor overnight    HPI:  89 yo F PMHx Aortic Stenosis,  HTN, HLD, Asthma/COPD, GERD, Gout, presenting for Left hip pain on ambulating x  2 weeks.  Patient reports that she was instructed to come to the ED today after MRI revealed nondisplaced left hip fracture. Patient reports that she has been ambulating with pain since the fall using a walker. For the pain she has been taking Ecotin 325 two pills a day for pain relief for the hip. She states the hematoma on her elbow is painful only when touched.  Patient reports she has a known history of aortic valve disorder and her Cardiologist Dr. Oliveira is closely following her with TTE to determine if she is a candidate for valve replacement.  Patient states can walk distances on flat surfaces without shortness of breath but does have TITUS on hills or stairs. She states she took all her medications today prior to coming to the hospital tonight.  Currently denies CP, SOB, palpitations, dizziness, headache, nausea, or pain.       ED Course: Patient did not receive any treatments in the ED prior to admission  VS T 97.7 HR 70 /71 RR 18 SpO2 96% on RA    Labs significant for WBC 11.9, Hg 10.5   EKG ; NSR with infrequent PVC and RBBB    (28 May 2021 20:13)      PAST MEDICAL & SURGICAL HISTORY:  Hypertension    Asthma    Hyperlipidemia    Lumbar back pain  Pinched nerve    Fractured hip    No significant past surgical history        MEDICATIONS  (STANDING):  ascorbic acid 500 milliGRAM(s) Oral two times a day  enoxaparin Injectable 40 milliGRAM(s) SubCutaneous daily  folic acid 1 milliGRAM(s) Oral daily  lactated ringers. 1000 milliLiter(s) (75 mL/Hr) IV Continuous <Continuous>  multivitamin 1 Tablet(s) Oral daily  pantoprazole    Tablet 40 milliGRAM(s) Oral before breakfast  polyethylene glycol 3350 17 Gram(s) Oral at bedtime  senna 2 Tablet(s) Oral at bedtime    MEDICATIONS  (PRN):  acetaminophen   Tablet .. 650 milliGRAM(s) Oral every 6 hours PRN Temp greater or equal to 38C (100.4F), Mild Pain (1 - 3)  benzocaine 15 mG/menthol 3.6 mG (Sugar-Free) Lozenge 1 Lozenge Oral every 2 hours PRN Sore Throat  ondansetron Injectable 4 milliGRAM(s) IV Push every 6 hours PRN Nausea and/or Vomiting  oxyCODONE    IR 2.5 milliGRAM(s) Oral every 4 hours PRN Moderate Pain (4 - 6)  oxyCODONE    IR 5 milliGRAM(s) Oral every 4 hours PRN Severe Pain (7 - 10)      Allergies    No Known Allergies    Intolerances        REVIEW OF SYSTEMS:    All other review of systems is negative unless indicated above    Vital Signs Last 24 Hrs  T(C): 36.6 (30 May 2021 05:06), Max: 36.8 (29 May 2021 17:53)  T(F): 97.9 (30 May 2021 05:06), Max: 98.2 (29 May 2021 17:53)  HR: 72 (30 May 2021 05:06) (72 - 91)  BP: 132/64 (30 May 2021 05:06) (110/57 - 175/71)  BP(mean): --  RR: 17 (30 May 2021 05:06) (12 - 19)  SpO2: 95% (30 May 2021 05:06) (92% - 100%)    I&O's Summary    29 May 2021 07:01  -  30 May 2021 07:00  --------------------------------------------------------  IN: 480 mL / OUT: 1000 mL / NET: -520 mL        PHYSICAL EXAM:    Constitutional: NAD, awake and alert, well-developed  Eyes:  EOMI,  Pupils round, No oral cyanosis.  HEENT: No exudate or erythema  Pulmonary: Non-labored, breath sounds are clear bilaterally, No wheezing, rales or rhonchi  Cardiovascular: Regular, S1 and S2, No murmurs, rubs, gallops oir clicks  Gastrointestinal: Bowel Sounds present, soft, nontender.   Ext: No significant LE edema with good pulses x 4  Neurological: Alert, no gross focal motor deficits  Skin: No rashes.  Psych:  Mood & affect appropriate    LABS: All Labs Reviewed:                        9.8    16.00 )-----------( 265      ( 30 May 2021 05:19 )             30.3                         10.2   17.79 )-----------( 286      ( 29 May 2021 18:03 )             31.6                         11.6   13.60 )-----------( 308      ( 29 May 2021 06:17 )             35.9     30 May 2021 05:19    139    |  101    |  15     ----------------------------<  128    4.4     |  31     |  0.71   29 May 2021 18:03    140    |  105    |  14     ----------------------------<  152    4.0     |  29     |  0.75   29 May 2021 06:17    143    |  105    |  15     ----------------------------<  94     3.6     |  32     |  0.69     Ca    8.2        30 May 2021 05:19  Ca    8.3        29 May 2021 18:03  Ca    9.0        29 May 2021 06:17      PT/INR - ( 29 May 2021 06:17 )   PT: 13.0 sec;   INR: 1.12 ratio         PTT - ( 29 May 2021 06:17 )  PTT:28.1 sec      Blood Culture:         RADIOLOGY/EKG:  < from: TTE Echo Complete w/o Contrast w/ Doppler (05.29.21 @ 08:06) >  Impression:  1. This a very technically limited study.  2. There is normal left ventricular size and normal left ventricular systolic function with an ejection fraction of 65%.  3. There is diastolic dysfunction.  4. There is mild left ventricular hypertrophy.  5. There is a trileaflet aortic valve with significant aortic valve calcification with severe aortic valve stenosis. Her mean aortic valve gradient of 64 mmHg and peak aortic valve gradient of 99 mmHg. The aortic valve area was calculated to be 0.72 sq cm. The peak aortic valve velocity is 5 m/s  6. There is mild aortic insufficiency.  7. There is significant mitral annular calcification with moderate mitral regurgitation.  8. There is trivial tricuspid regurgitation.  9. The pulmonic valve was not well visualized on this study.  10. No significant pericardial effusion.  11. There is normal right atrial and right ventricular size and systolic function.  Attending Attestation:   20 minutes spent on total encounter; more than 50% of the visit was spent counseling and/or coordinating care by the attending physician.     ASSESSMENT AND PLAN

## 2021-05-30 NOTE — OCCUPATIONAL THERAPY INITIAL EVALUATION ADULT - LIVES WITH, PROFILE
pt states lives alone in private residence, standard bathroom setup, and was full independent before incident/alone

## 2021-05-30 NOTE — PROGRESS NOTE ADULT - ASSESSMENT
A/P:  Patient is a 89y Female s/p L hip nick Stable POD 1    -Medical management per primary team  -Posterior hip precautions  -abd pillow at all times while in bed/chair  -h/h stable - transfuse prn  -Ice/Elevate  -Incentive Spirometry  -Multimodal Analgesia  -DVT PE ppx  -SCDs  -PT/OT OOB  -WBAT  -Discharge planning - pending PT eval  -Will discuss w/ attending and advise if plan changes

## 2021-05-30 NOTE — PROGRESS NOTE ADULT - NUTRITIONAL ASSESSMENT
This patient has been assessed with a concern for Malnutrition and has been determined to have a diagnosis/diagnoses of Severe protein-calorie malnutrition.    This patient is being managed with:   Diet Regular-  Entered: May 30 2021 10:51AM

## 2021-05-30 NOTE — PROGRESS NOTE ADULT - PROBLEM SELECTOR PLAN 3
- pt with c/o suprapubic pressure and urinary hesitancy yesterday, improved today   - afebrile, mild leukocytosis downtrending   - UA with mod blood -- suspect 2/2 straight cath trauma  - f/u UCx

## 2021-05-30 NOTE — PROGRESS NOTE ADULT - SUBJECTIVE AND OBJECTIVE BOX
Orthopedics     Patient seen and examined at bedside, resting comfortably. No acute events overnight. Reports she has not ambulated w/ PT yet. Pain adequately controlled. Patient feeling well. Denies CP/SOB. No nausea or vomiting. No other acute complaints at this time.    Vital Signs Last 24 Hrs  T(C): 36.6 (05-30-21 @ 05:06), Max: 36.8 (05-29-21 @ 17:53)  T(F): 97.9 (05-30-21 @ 05:06), Max: 98.2 (05-29-21 @ 17:53)  HR: 72 (05-30-21 @ 05:06) (72 - 91)  BP: 132/64 (05-30-21 @ 05:06) (110/57 - 175/71)  BP(mean): --  RR: 17 (05-30-21 @ 05:06) (12 - 19)  SpO2: 95% (05-30-21 @ 05:06) (92% - 100%)                        9.8    16.00 )-----------( 265      ( 30 May 2021 05:19 )             30.3     30 May 2021 05:19    139    |  101    |  15     ----------------------------<  128    4.4     |  31     |  0.71     Ca    8.2        30 May 2021 05:19  PT/INR - ( 29 May 2021 06:17 )   PT: 13.0 sec;   INR: 1.12 ratio    PTT - ( 29 May 2021 06:17 )  PTT:28.1 sec    Exam:  Gen: NAD, Awake and alert  LLE:  Dressing clean and dry  Abd pillow in place  +EHL/FHL/TA/GS  SILT L2-S1  +DP  Calf Soft NT  Compartments soft and compressible

## 2021-05-30 NOTE — PROGRESS NOTE ADULT - ASSESSMENT
89F with known severe aortic valve stenosis with normal LVEF presents with LEFT hip fracture now s/p repair POD 1.    Suggest:  1.  LEFT hip fracture repair went well.  2. Continue with Zocor  3. Pain mgmt. WBAT as per ortho, DVT prophylaxis  4. Continue with amlodipine.  Would increase to 5 mg if persistent elevated BP despite pain mgmt.  5. Will follow.

## 2021-05-30 NOTE — OCCUPATIONAL THERAPY INITIAL EVALUATION ADULT - PERTINENT HX OF CURRENT PROBLEM, REHAB EVAL
89 yoF presentingw/ L hip pain on ambulating x 2 weeks.  Pt reports she was instructed to come to the ED today after MRI revealed nondisplaced L hip fx. Pt reports she has been ambulating w/ pain since the fall using a walker.

## 2021-05-30 NOTE — PROGRESS NOTE ADULT - SUBJECTIVE AND OBJECTIVE BOX
Patient is a 89y old  Female who presents with a chief complaint of Closed Reduction of Left Hip Fracture (30 May 2021 11:17)      INTERVAL HPI/OVERNIGHT EVENTS: Patient seen and examined at bedside. No overnight events occurred. Patient states pain is well controlled at rest but 10/10 during PT session, requiring oxycodone 5 mg this morning. Denies any fever, chills, SOB, CP, palpitations, abd pain, N/V.     MEDICATIONS  (STANDING):  ascorbic acid 500 milliGRAM(s) Oral two times a day  enoxaparin Injectable 40 milliGRAM(s) SubCutaneous daily  folic acid 1 milliGRAM(s) Oral daily  lactated ringers. 1000 milliLiter(s) (75 mL/Hr) IV Continuous <Continuous>  multivitamin 1 Tablet(s) Oral daily  pantoprazole    Tablet 40 milliGRAM(s) Oral before breakfast  polyethylene glycol 3350 17 Gram(s) Oral at bedtime  senna 2 Tablet(s) Oral at bedtime    MEDICATIONS  (PRN):  acetaminophen   Tablet .. 650 milliGRAM(s) Oral every 6 hours PRN Temp greater or equal to 38C (100.4F), Mild Pain (1 - 3)  benzocaine 15 mG/menthol 3.6 mG (Sugar-Free) Lozenge 1 Lozenge Oral every 2 hours PRN Sore Throat  ondansetron Injectable 4 milliGRAM(s) IV Push every 6 hours PRN Nausea and/or Vomiting  oxyCODONE    IR 2.5 milliGRAM(s) Oral every 4 hours PRN Moderate Pain (4 - 6)  oxyCODONE    IR 5 milliGRAM(s) Oral every 4 hours PRN Severe Pain (7 - 10)      Allergies    No Known Allergies    Intolerances        REVIEW OF SYSTEMS:  CONSTITUTIONAL: No fever or chills  HEENT:  No headache, no sore throat  RESPIRATORY: No cough, wheezing, or shortness of breath  CARDIOVASCULAR: No chest pain, palpitations  GASTROINTESTINAL: admits lower abd pressure, No nausea, vomiting, or diarrhea  GENITOURINARY: No dysuria, frequency, or hematuria; admits hesitancy   NEUROLOGICAL: no focal weakness or dizziness  MUSCULOSKELETAL: L hip pain while WB/movement, admits left elbow pain with palpation      Vital Signs Last 24 Hrs  T(C): 36.6 (30 May 2021 05:06), Max: 36.8 (29 May 2021 17:53)  T(F): 97.9 (30 May 2021 05:06), Max: 98.2 (29 May 2021 17:53)  HR: 92 (30 May 2021 10:51) (72 - 92)  BP: 147/56 (30 May 2021 10:51) (110/57 - 175/71)  BP(mean): --  RR: 17 (30 May 2021 05:06) (12 - 19)  SpO2: 97% (30 May 2021 10:51) (95% - 100%)    PHYSICAL EXAM:  GENERAL: NAD, elderly  HEENT:  anicteric, moist mucous membranes  CHEST/LUNG:  CTA b/l, no rales, wheezes, or rhonchi  HEART:  RRR, S1, S2, systolic murmur   ABDOMEN:  BS+, soft, nontender, nondistended  EXTREMITIES: dressing of L hip c/d/i, +hematoma left elbow(TTP); no cyanosis, or calf tenderness  NERVOUS SYSTEM: answers questions and follows commands appropriately    LABS:                        9.8    16.00 )-----------( 265      ( 30 May 2021 05:19 )             30.3     CBC Full  -  ( 30 May 2021 05:19 )  WBC Count : 16.00 K/uL  Hemoglobin : 9.8 g/dL  Hematocrit : 30.3 %  Platelet Count - Automated : 265 K/uL  Mean Cell Volume : 91.3 fl  Mean Cell Hemoglobin : 29.5 pg  Mean Cell Hemoglobin Concentration : 32.3 gm/dL  Auto Neutrophil # : 13.61 K/uL  Auto Lymphocyte # : 1.26 K/uL  Auto Monocyte # : 1.02 K/uL  Auto Eosinophil # : 0.00 K/uL  Auto Basophil # : 0.03 K/uL  Auto Neutrophil % : 85.0 %  Auto Lymphocyte % : 7.9 %  Auto Monocyte % : 6.4 %  Auto Eosinophil % : 0.0 %  Auto Basophil % : 0.2 %    30 May 2021 05:19    139    |  101    |  15     ----------------------------<  128    4.4     |  31     |  0.71     Ca    8.2        30 May 2021 05:19      PT/INR - ( 29 May 2021 06:17 )   PT: 13.0 sec;   INR: 1.12 ratio         PTT - ( 29 May 2021 06:17 )  PTT:28.1 sec  Urinalysis Basic - ( 29 May 2021 08:53 )    Color: Yellow / Appearance: Clear / S.010 / pH: x  Gluc: x / Ketone: Negative  / Bili: Negative / Urobili: Negative   Blood: x / Protein: 30 mg/dL / Nitrite: Negative   Leuk Esterase: Negative / RBC: 6-10 /HPF / WBC 0-2   Sq Epi: x / Non Sq Epi: Negative / Bacteria: x      CAPILLARY BLOOD GLUCOSE            Culture - Urine (collected 21 @ 12:13)  Source: .Urine Clean Catch (Midstream)  Final Report (21 @ 11:04):    No growth        RADIOLOGY & ADDITIONAL TESTS:    Personally reviewed.     Consultant(s) Notes Reviewed:  [x] YES  [ ] NO

## 2021-05-30 NOTE — PROGRESS NOTE ADULT - PROBLEM SELECTOR PLAN 1
- Nondisplaced Left hip fracture Evaluated by Orthopedist group Dr. Marx  - s/p L hip hemiarthroplasty, POD#1  - abd pillow at all times while in bed/chair  - WBAT   - Patient reports she does not have pain at rest, only with movement/ambulation  - DASH diet  - pain control PRN  - incentive spirometry - Nondisplaced Left hip fracture Evaluated by Orthopedist group Dr. Marx  - s/p L hip hemiarthroplasty, POD#1  - abd pillow at all times while in bed/chair  - WBAT   - Patient reports she does not have pain at rest, only with movement/ambulation  - DASH diet  - pain control PRN  - incentive spirometry  - PT rec discharge to [facility]

## 2021-05-30 NOTE — OCCUPATIONAL THERAPY INITIAL EVALUATION ADULT - SITTING BALANCE: DYNAMIC
fair minus Xelcarolynz Pregnancy And Lactation Text: This medication is Pregnancy Category D and is not considered safe during pregnancy.  The risk during breast feeding is also uncertain.

## 2021-05-30 NOTE — PROGRESS NOTE ADULT - ATTENDING COMMENTS
Resting in bed.  Reports pain with activity, controlled with PRN oxycodone.  No SOB on RA.  No N/V.  NAD.    A&P:  1. Left hip fracture - Left hip fracture s/p hemiarthroplasty, POD#1.  Ortho input appreciated.  WBAT.  Pain control.  PT and OT recommending rehab at discharge.  2. Asthma - Resume home inhaler medications.  3. HTN - Cardiology input appreciated.  Resume home Amlodipine.  4. Gout - Resume home Allopurinol.    Remainder of plan as outlined above.

## 2021-05-30 NOTE — OCCUPATIONAL THERAPY INITIAL EVALUATION ADULT - BALANCE TRAINING, PT EVAL
Pt will demonstrate improved static/dynamic sitting and standing balance by 1/2 level within 4 weeks to increase participation with self care activities

## 2021-05-31 ENCOUNTER — TRANSCRIPTION ENCOUNTER (OUTPATIENT)
Age: 86
End: 2021-05-31

## 2021-05-31 LAB
ANION GAP SERPL CALC-SCNC: 5 MMOL/L — SIGNIFICANT CHANGE UP (ref 5–17)
BASOPHILS # BLD AUTO: 0.07 K/UL — SIGNIFICANT CHANGE UP (ref 0–0.2)
BASOPHILS NFR BLD AUTO: 0.5 % — SIGNIFICANT CHANGE UP (ref 0–2)
BUN SERPL-MCNC: 14 MG/DL — SIGNIFICANT CHANGE UP (ref 7–23)
CALCIUM SERPL-MCNC: 8.4 MG/DL — LOW (ref 8.5–10.1)
CHLORIDE SERPL-SCNC: 103 MMOL/L — SIGNIFICANT CHANGE UP (ref 96–108)
CO2 SERPL-SCNC: 32 MMOL/L — HIGH (ref 22–31)
CREAT SERPL-MCNC: 0.72 MG/DL — SIGNIFICANT CHANGE UP (ref 0.5–1.3)
EOSINOPHIL # BLD AUTO: 0.16 K/UL — SIGNIFICANT CHANGE UP (ref 0–0.5)
EOSINOPHIL NFR BLD AUTO: 1.1 % — SIGNIFICANT CHANGE UP (ref 0–6)
GLUCOSE SERPL-MCNC: 103 MG/DL — HIGH (ref 70–99)
HCT VFR BLD CALC: 31.8 % — LOW (ref 34.5–45)
HGB BLD-MCNC: 10.3 G/DL — LOW (ref 11.5–15.5)
IMM GRANULOCYTES NFR BLD AUTO: 0.5 % — SIGNIFICANT CHANGE UP (ref 0–1.5)
LYMPHOCYTES # BLD AUTO: 1.79 K/UL — SIGNIFICANT CHANGE UP (ref 1–3.3)
LYMPHOCYTES # BLD AUTO: 11.8 % — LOW (ref 13–44)
MCHC RBC-ENTMCNC: 29.9 PG — SIGNIFICANT CHANGE UP (ref 27–34)
MCHC RBC-ENTMCNC: 32.4 GM/DL — SIGNIFICANT CHANGE UP (ref 32–36)
MCV RBC AUTO: 92.4 FL — SIGNIFICANT CHANGE UP (ref 80–100)
MONOCYTES # BLD AUTO: 1.5 K/UL — HIGH (ref 0–0.9)
MONOCYTES NFR BLD AUTO: 9.9 % — SIGNIFICANT CHANGE UP (ref 2–14)
NEUTROPHILS # BLD AUTO: 11.62 K/UL — HIGH (ref 1.8–7.4)
NEUTROPHILS NFR BLD AUTO: 76.2 % — SIGNIFICANT CHANGE UP (ref 43–77)
NRBC # BLD: 0 /100 WBCS — SIGNIFICANT CHANGE UP (ref 0–0)
PLATELET # BLD AUTO: 284 K/UL — SIGNIFICANT CHANGE UP (ref 150–400)
POTASSIUM SERPL-MCNC: 4.1 MMOL/L — SIGNIFICANT CHANGE UP (ref 3.5–5.3)
POTASSIUM SERPL-SCNC: 4.1 MMOL/L — SIGNIFICANT CHANGE UP (ref 3.5–5.3)
RBC # BLD: 3.44 M/UL — LOW (ref 3.8–5.2)
RBC # FLD: 13.8 % — SIGNIFICANT CHANGE UP (ref 10.3–14.5)
SODIUM SERPL-SCNC: 140 MMOL/L — SIGNIFICANT CHANGE UP (ref 135–145)
WBC # BLD: 15.21 K/UL — HIGH (ref 3.8–10.5)
WBC # FLD AUTO: 15.21 K/UL — HIGH (ref 3.8–10.5)

## 2021-05-31 PROCEDURE — 99232 SBSQ HOSP IP/OBS MODERATE 35: CPT

## 2021-05-31 RX ADMIN — Medication 100 MILLIGRAM(S): at 05:34

## 2021-05-31 RX ADMIN — Medication 500 MILLIGRAM(S): at 17:05

## 2021-05-31 RX ADMIN — PANTOPRAZOLE SODIUM 40 MILLIGRAM(S): 20 TABLET, DELAYED RELEASE ORAL at 05:34

## 2021-05-31 RX ADMIN — SIMVASTATIN 20 MILLIGRAM(S): 20 TABLET, FILM COATED ORAL at 21:15

## 2021-05-31 RX ADMIN — AMLODIPINE BESYLATE 2.5 MILLIGRAM(S): 2.5 TABLET ORAL at 05:34

## 2021-05-31 RX ADMIN — Medication 1 MILLIGRAM(S): at 11:31

## 2021-05-31 RX ADMIN — Medication 500 MILLIGRAM(S): at 05:34

## 2021-05-31 RX ADMIN — OXYCODONE HYDROCHLORIDE 5 MILLIGRAM(S): 5 TABLET ORAL at 09:24

## 2021-05-31 RX ADMIN — Medication 100 MILLIGRAM(S): at 17:05

## 2021-05-31 RX ADMIN — POLYETHYLENE GLYCOL 3350 17 GRAM(S): 17 POWDER, FOR SOLUTION ORAL at 21:15

## 2021-05-31 RX ADMIN — OXYCODONE HYDROCHLORIDE 5 MILLIGRAM(S): 5 TABLET ORAL at 10:24

## 2021-05-31 RX ADMIN — MOMETASONE FUROATE 1 PUFF(S): 220 INHALANT RESPIRATORY (INHALATION) at 06:47

## 2021-05-31 RX ADMIN — Medication 1 TABLET(S): at 11:31

## 2021-05-31 RX ADMIN — ENOXAPARIN SODIUM 40 MILLIGRAM(S): 100 INJECTION SUBCUTANEOUS at 11:31

## 2021-05-31 RX ADMIN — SENNA PLUS 2 TABLET(S): 8.6 TABLET ORAL at 21:15

## 2021-05-31 NOTE — DISCHARGE NOTE PROVIDER - DETAILS OF MALNUTRITION DIAGNOSIS/DIAGNOSES
This patient has been assessed with a concern for Malnutrition and was treated during this hospitalization for the following Nutrition diagnosis/diagnoses:     -  05/29/2021: Severe protein-calorie malnutrition

## 2021-05-31 NOTE — PROGRESS NOTE ADULT - ASSESSMENT
89 yo F PMHx Aortic Stenosis,  HTN, HLD, Asthma/COPD, GERD, Gout, presenting for Left hip pain on ambulating x  2 weeks. Admitted for reduction of nondisplaced left hip fracture. 89 yo F PMHx Aortic Stenosis,  HTN, HLD, Asthma/COPD, GERD, Gout, presenting for Left hip pain on ambulating x  2 weeks. Admitted for reduction of nondisplaced left hip fracture.

## 2021-05-31 NOTE — PROGRESS NOTE ADULT - PROBLEM SELECTOR PLAN 6
- Chronic, Continue on home Simvastatin 20 mg daily

## 2021-05-31 NOTE — PROGRESS NOTE ADULT - SUBJECTIVE AND OBJECTIVE BOX
[facility] Cardiology Progress Note [phone] (Dr. Marin, Ngoc, Heron, Augustin)    CHIEF COMPLAINT: Patient is a 89y old  Female who presents with a chief complaint of Closed Reduction of Left Hip Fracture (30 May 2021 12:33)      Follow Up Today: The patient denies any chest discomfort or shortness of breath.    HPI:  89 yo F PMHx Aortic Stenosis,  HTN, HLD, Asthma/COPD, GERD, Gout, presenting for Left hip pain on ambulating x  2 weeks.  Patient reports that she was instructed to come to the ED today after MRI revealed nondisplaced left hip fracture. Patient reports that she has been ambulating with pain since the fall using a walker. For the pain she has been taking Ecotin 325 two pills a day for pain relief for the hip. She states the hematoma on her elbow is painful only when touched.  Patient reports she has a known history of aortic valve disorder and her Cardiologist Dr. Oliveira is closely following her with TTE to determine if she is a candidate for valve replacement.  Patient states can walk distances on flat surfaces without shortness of breath but does have TITUS on hills or stairs. She states she took all her medications today prior to coming to the hospital tonight.  Currently denies CP, SOB, palpitations, dizziness, headache, nausea, or pain.       ED Course: Patient did not receive any treatments in the ED prior to admission  VS T 97.7 HR 70 /71 RR 18 SpO2 96% on RA    Labs significant for WBC 11.9, Hg 10.5   EKG ; NSR with infrequent PVC and RBBB    (28 May 2021 20:13)      PAST MEDICAL & SURGICAL HISTORY:  Hypertension    Asthma    Hyperlipidemia    Lumbar back pain  Pinched nerve    Fractured hip    No significant past surgical history        MEDICATIONS  (STANDING):  allopurinol 100 milliGRAM(s) Oral two times a day  amLODIPine   Tablet 2.5 milliGRAM(s) Oral daily  ascorbic acid 500 milliGRAM(s) Oral two times a day  enoxaparin Injectable 40 milliGRAM(s) SubCutaneous daily  folic acid 1 milliGRAM(s) Oral daily  mometasone 220 MICROgram(s) Inhaler 1 Puff(s) Inhalation daily  multivitamin 1 Tablet(s) Oral daily  pantoprazole    Tablet 40 milliGRAM(s) Oral before breakfast  polyethylene glycol 3350 17 Gram(s) Oral at bedtime  senna 2 Tablet(s) Oral at bedtime  simvastatin 20 milliGRAM(s) Oral at bedtime    MEDICATIONS  (PRN):  acetaminophen   Tablet .. 650 milliGRAM(s) Oral every 6 hours PRN Temp greater or equal to 38C (100.4F), Mild Pain (1 - 3)  ALBUTerol    90 MICROgram(s) HFA Inhaler 2 Puff(s) Inhalation every 6 hours PRN Shortness of Breath and/or Wheezing  benzocaine 15 mG/menthol 3.6 mG (Sugar-Free) Lozenge 1 Lozenge Oral every 2 hours PRN Sore Throat  ondansetron Injectable 4 milliGRAM(s) IV Push every 6 hours PRN Nausea and/or Vomiting  oxyCODONE    IR 2.5 milliGRAM(s) Oral every 4 hours PRN Moderate Pain (4 - 6)  oxyCODONE    IR 5 milliGRAM(s) Oral every 4 hours PRN Severe Pain (7 - 10)      Allergies    No Known Allergies    Intolerances        REVIEW OF SYSTEMS:    All other review of systems is negative unless indicated above    Vital Signs Last 24 Hrs  T(C): 37 (31 May 2021 05:21), Max: 37 (31 May 2021 05:21)  T(F): 98.6 (31 May 2021 05:21), Max: 98.6 (31 May 2021 05:21)  HR: 87 (31 May 2021 05:21) (78 - 92)  BP: 146/67 (31 May 2021 05:21) (117/65 - 155/67)  BP(mean): --  RR: 19 (31 May 2021 05:21) (18 - 19)  SpO2: 93% (31 May 2021 05:21) (93% - 97%)    I&O's Summary    29 May 2021 07:01  -  30 May 2021 07:00  --------------------------------------------------------  IN: 480 mL / OUT: 1000 mL / NET: -520 mL    30 May 2021 07:01  -  31 May 2021 06:19  --------------------------------------------------------  IN: 360 mL / OUT: 1200 mL / NET: -840 mL        PHYSICAL EXAM:    Constitutional: NAD, awake and alert, well-developed  Eyes:  EOMI,  Pupils round, No oral cyanosis.  HEENT: No exudate or erythema  Pulmonary: Non-labored, breath sounds are clear bilaterally, No wheezing, rales or rhonchi  Cardiovascular: Regular, S1 and S2, No murmurs, rubs, gallops oir clicks  Gastrointestinal: Bowel Sounds present, soft, nontender.   Ext: No significant LE edema with good pulses x 4  Neurological: Alert, no gross focal motor deficits  Skin: No rashes.  Psych:  Mood & affect appropriate    LABS: All Labs Reviewed:                        9.8    16.00 )-----------( 265      ( 30 May 2021 05:19 )             30.3                         10.2   17.79 )-----------( 286      ( 29 May 2021 18:03 )             31.6                         11.6   13.60 )-----------( 308      ( 29 May 2021 06:17 )             35.9     30 May 2021 05:19    139    |  101    |  15     ----------------------------<  128    4.4     |  31     |  0.71   29 May 2021 18:03    140    |  105    |  14     ----------------------------<  152    4.0     |  29     |  0.75   29 May 2021 06:17    143    |  105    |  15     ----------------------------<  94     3.6     |  32     |  0.69     Ca    8.2        30 May 2021 05:19  Ca    8.3        29 May 2021 18:03  Ca    9.0        29 May 2021 06:17            Blood Culture: Organism --  Gram Stain Blood -- Gram Stain --  Specimen Source .Urine Clean Catch (Midstream)  Culture-Blood --            RADIOLOGY/EKG:    Attending Attestation:   20 minutes spent on total encounter; more than 50% of the visit was spent counseling and/or coordinating care by the attending physician.     ASSESSMENT AND PLAN [facility] Progress Note [phone] (Dr. Marin, Ngoc, Heron, Augustin)    CHIEF COMPLAINT: Patient is a 89y old  Female who presents with a chief complaint of Closed Reduction of Left Hip Fracture (30 May 2021 12:33)      Follow Up Today: The patient denies any chest discomfort or shortness of breath. She was in a chair yesterday. No events on tele overnight.    HPI:  89 yo F PMHx Aortic Stenosis,  HTN, HLD, Asthma/COPD, GERD, Gout, presenting for Left hip pain on ambulating x  2 weeks.  Patient reports that she was instructed to come to the ED today after MRI revealed nondisplaced left hip fracture. Patient reports that she has been ambulating with pain since the fall using a walker. For the pain she has been taking Ecotin 325 two pills a day for pain relief for the hip. She states the hematoma on her elbow is painful only when touched.  Patient reports she has a known history of aortic valve disorder and her Cardiologist Dr. Oliveira is closely following her with TTE to determine if she is a candidate for valve replacement.  Patient states can walk distances on flat surfaces without shortness of breath but does have TITUS on hills or stairs. She states she took all her medications today prior to coming to the hospital tonight.  Currently denies CP, SOB, palpitations, dizziness, headache, nausea, or pain.       ED Course: Patient did not receive any treatments in the ED prior to admission  VS T 97.7 HR 70 /71 RR 18 SpO2 96% on RA    Labs significant for WBC 11.9, Hg 10.5   EKG ; NSR with infrequent PVC and RBBB    (28 May 2021 20:13)      PAST MEDICAL & SURGICAL HISTORY:  Hypertension    Asthma    Hyperlipidemia    Lumbar back pain  Pinched nerve    Fractured hip    No significant past surgical history        MEDICATIONS  (STANDING):  allopurinol 100 milliGRAM(s) Oral two times a day  amLODIPine   Tablet 2.5 milliGRAM(s) Oral daily  ascorbic acid 500 milliGRAM(s) Oral two times a day  enoxaparin Injectable 40 milliGRAM(s) SubCutaneous daily  folic acid 1 milliGRAM(s) Oral daily  mometasone 220 MICROgram(s) Inhaler 1 Puff(s) Inhalation daily  multivitamin 1 Tablet(s) Oral daily  pantoprazole    Tablet 40 milliGRAM(s) Oral before breakfast  polyethylene glycol 3350 17 Gram(s) Oral at bedtime  senna 2 Tablet(s) Oral at bedtime  simvastatin 20 milliGRAM(s) Oral at bedtime    MEDICATIONS  (PRN):  acetaminophen   Tablet .. 650 milliGRAM(s) Oral every 6 hours PRN Temp greater or equal to 38C (100.4F), Mild Pain (1 - 3)  ALBUTerol    90 MICROgram(s) HFA Inhaler 2 Puff(s) Inhalation every 6 hours PRN Shortness of Breath and/or Wheezing  benzocaine 15 mG/menthol 3.6 mG (Sugar-Free) Lozenge 1 Lozenge Oral every 2 hours PRN Sore Throat  ondansetron Injectable 4 milliGRAM(s) IV Push every 6 hours PRN Nausea and/or Vomiting  oxyCODONE    IR 2.5 milliGRAM(s) Oral every 4 hours PRN Moderate Pain (4 - 6)  oxyCODONE    IR 5 milliGRAM(s) Oral every 4 hours PRN Severe Pain (7 - 10)      Allergies    No Known Allergies    Intolerances        REVIEW OF SYSTEMS:    All other review of systems is negative unless indicated above    Vital Signs Last 24 Hrs  T(C): 37 (31 May 2021 05:21), Max: 37 (31 May 2021 05:21)  T(F): 98.6 (31 May 2021 05:21), Max: 98.6 (31 May 2021 05:21)  HR: 87 (31 May 2021 05:21) (78 - 92)  BP: 146/67 (31 May 2021 05:21) (117/65 - 155/67)  BP(mean): --  RR: 19 (31 May 2021 05:21) (18 - 19)  SpO2: 93% (31 May 2021 05:21) (93% - 97%)    I&O's Summary    29 May 2021 07:01  -  30 May 2021 07:00  --------------------------------------------------------  IN: 480 mL / OUT: 1000 mL / NET: -520 mL    30 May 2021 07:01  -  31 May 2021 06:19  --------------------------------------------------------  IN: 360 mL / OUT: 1200 mL / NET: -840 mL        PHYSICAL EXAM:    Constitutional: NAD, awake and alert, well-developed  Eyes:  EOMI,  Pupils round, No oral cyanosis.  HEENT: No exudate or erythema  Pulmonary: Non-labored, breath sounds are clear bilaterally, No wheezing, rales or rhonchi  Cardiovascular: Regular, S1 and S2, MICHAEL  Gastrointestinal: Bowel Sounds present, soft, nontender.   Ext: No significant LE edema   Neurological: Alert, no gross focal motor deficits  Skin: No rashes.  Psych:  Mood & affect appropriate    LABS: All Labs Reviewed:                        9.8    16.00 )-----------( 265      ( 30 May 2021 05:19 )             30.3                         10.2   17.79 )-----------( 286      ( 29 May 2021 18:03 )             31.6                         11.6   13.60 )-----------( 308      ( 29 May 2021 06:17 )             35.9     30 May 2021 05:19    139    |  101    |  15     ----------------------------<  128    4.4     |  31     |  0.71   29 May 2021 18:03    140    |  105    |  14     ----------------------------<  152    4.0     |  29     |  0.75   29 May 2021 06:17    143    |  105    |  15     ----------------------------<  94     3.6     |  32     |  0.69     Ca    8.2        30 May 2021 05:19  Ca    8.3        29 May 2021 18:03  Ca    9.0        29 May 2021 06:17            Blood Culture: Organism --  Gram Stain Blood -- Gram Stain --  Specimen Source .Urine Clean Catch (Midstream)  Culture-Blood --            RADIOLOGY/EKG:    Attending Attestation:   20 minutes spent on total encounter; more than 50% of the visit was spent counseling and/or coordinating care by the attending physician.     ASSESSMENT AND PLAN

## 2021-05-31 NOTE — PROGRESS NOTE ADULT - PROBLEM SELECTOR PLAN 1
- Nondisplaced Left hip fracture Evaluated by Orthopedist group Dr. Marx  - s/p L hip hemiarthroplasty, POD#1  - abd pillow at all times while in bed/chair  - WBAT   - Patient reports she does not have pain at rest, only with movement/ambulation  - DASH diet  - pain control PRN  - incentive spirometry  - PT rec discharge to [facility] - Nondisplaced Left hip fracture Evaluated by Orthopedist group Dr. Marx  - s/p L hip hemiarthroplasty, POD#2  - abd pillow at all times while in bed/chair  - WBAT   - Patient reports she does not have pain at rest, only with movement/ambulation  - DASH diet  - pain control PRN  - incentive spirometry  - PT rec discharge to [facility]

## 2021-05-31 NOTE — PROGRESS NOTE ADULT - PROBLEM SELECTOR PLAN 3
- pt with c/o suprapubic pressure and urinary hesitancy yesterday, improved today   - afebrile, mild leukocytosis downtrending   - UA with mod blood -- suspect 2/2 straight cath trauma  - f/u UCx - pt with c/o suprapubic pressure and urinary hesitancy yesterday, improved today   - afebrile, mild leukocytosis downtrending   - UA with mod blood -- suspect 2/2 straight cath trauma  - f/u UCx- no growth

## 2021-05-31 NOTE — PROGRESS NOTE ADULT - SUBJECTIVE AND OBJECTIVE BOX
Orthopedics     Patient seen and examined at bedside, resting comfortably. No acute events overnight. Pain adequately controlled. Patient feeling well. Denies CP/SOB. No nausea or vomiting. No other acute complaints at this time.    Vital Signs Last 24 Hrs  T(C): 37 (31 May 2021 05:21), Max: 37 (31 May 2021 05:21)  T(F): 98.6 (31 May 2021 05:21), Max: 98.6 (31 May 2021 05:21)  HR: 87 (31 May 2021 05:21) (78 - 92)  BP: 146/67 (31 May 2021 05:21) (117/65 - 155/67)  BP(mean): --  RR: 19 (31 May 2021 05:21) (18 - 19)  SpO2: 93% (31 May 2021 05:21) (93% - 97%)    Exam:  Gen: NAD, Awake and alert  LLE:  Dressing clean and dry  Abd pillow was not in place this AM, discussed the importance of the abd pillow in preventing dislocation  +EHL/FHL/TA/GS  SILT L2-S1  +DP  Calf Soft NT  Compartments soft and compressible

## 2021-05-31 NOTE — DISCHARGE NOTE PROVIDER - CARE PROVIDER_API CALL
Kranthi Marx  ORTHOPAEDIC SURGERY  [address] [place] [address]  Phone: [phone]  Fax: [phone]  Follow Up Time:    Kranthi Marx  ORTHOPAEDIC SURGERY  [address] [place] [address]  Phone: [phone]  Fax: [phone]  Follow Up Time:     Joseph Tripathi  INTERNAL MEDICINE  [address] [place] [address]  Phone: [phone]  Fax: [phone]  Follow Up Time: Routine    Teofilo Oliveira)  Cardiovascular Disease  [address] [facility], [address] [place] [address]  Phone: [phone]  Fax: [phone]  Follow Up Time: Routine

## 2021-05-31 NOTE — DISCHARGE NOTE PROVIDER - NSDCFUADDINST_GEN_ALL_CORE_FT
1.	Pain Control  2.	Walking with full weight bearing as tolerated, with assistive devices (walker/Cane as Needed)  3.	DVT Prophylaxis for 30 days  4.	PT as needed  5.	Follow up with Dr. Marx as Outpatient in 10-14 Days after Discharge from the Hospital or Rehab. Call Office For Appointment.  6.	Remove Staples Post-Op Day 14, and Remove Dressing Post-Op Day 10, with Daily Dressing Changes as Need.  7.	Ice affected area as Needed  8.	Keep Dressing  Clean and dry.

## 2021-05-31 NOTE — PROGRESS NOTE ADULT - SUBJECTIVE AND OBJECTIVE BOX
INTERVAL HPI/OVERNIGHT EVENTS:    MEDICATIONS  (STANDING):  allopurinol 100 milliGRAM(s) Oral two times a day  amLODIPine   Tablet 2.5 milliGRAM(s) Oral daily  ascorbic acid 500 milliGRAM(s) Oral two times a day  enoxaparin Injectable 40 milliGRAM(s) SubCutaneous daily  folic acid 1 milliGRAM(s) Oral daily  mometasone 220 MICROgram(s) Inhaler 1 Puff(s) Inhalation daily  multivitamin 1 Tablet(s) Oral daily  pantoprazole    Tablet 40 milliGRAM(s) Oral before breakfast  polyethylene glycol 3350 17 Gram(s) Oral at bedtime  senna 2 Tablet(s) Oral at bedtime  simvastatin 20 milliGRAM(s) Oral at bedtime    MEDICATIONS  (PRN):  acetaminophen   Tablet .. 650 milliGRAM(s) Oral every 6 hours PRN Temp greater or equal to 38C (100.4F), Mild Pain (1 - 3)  ALBUTerol    90 MICROgram(s) HFA Inhaler 2 Puff(s) Inhalation every 6 hours PRN Shortness of Breath and/or Wheezing  benzocaine 15 mG/menthol 3.6 mG (Sugar-Free) Lozenge 1 Lozenge Oral every 2 hours PRN Sore Throat  ondansetron Injectable 4 milliGRAM(s) IV Push every 6 hours PRN Nausea and/or Vomiting  oxyCODONE    IR 2.5 milliGRAM(s) Oral every 4 hours PRN Moderate Pain (4 - 6)  oxyCODONE    IR 5 milliGRAM(s) Oral every 4 hours PRN Severe Pain (7 - 10)      Allergies    No Known Allergies    Intolerances      REVIEW OF SYSTEMS:  CONSTITUTIONAL: No fever or chills  HEENT:  No headache, no sore throat  RESPIRATORY: No cough, wheezing, or shortness of breath  CARDIOVASCULAR: No chest pain, palpitations  GASTROINTESTINAL: admits lower abd pressure, No nausea, vomiting, or diarrhea  GENITOURINARY: No dysuria, frequency, or hematuria; admits hesitancy   NEUROLOGICAL: no focal weakness or dizziness  MUSCULOSKELETAL: L hip pain while WB/movement, admits left elbow pain with palpation    Vital Signs Last 24 Hrs  T(C): 37 (31 May 2021 05:21), Max: 37 (31 May 2021 05:21)  T(F): 98.6 (31 May 2021 05:21), Max: 98.6 (31 May 2021 05:21)  HR: 87 (31 May 2021 05:21) (78 - 92)  BP: 146/67 (31 May 2021 05:21) (117/65 - 155/67)  BP(mean): --  RR: 19 (31 May 2021 05:21) (18 - 19)  SpO2: 93% (31 May 2021 05:21) (93% - 97%)    05-30 @ 07:01  -  05 @ 07:00  --------------------------------------------------------  IN: 360 mL / OUT: 1600 mL / NET: -1240 mL      PHYSICAL EXAM:  GENERAL: NAD, elderly  HEENT:  anicteric, moist mucous membranes  CHEST/LUNG:  CTA b/l, no rales, wheezes, or rhonchi  HEART:  RRR, S1, S2, systolic murmur   ABDOMEN:  BS+, soft, nontender, nondistended  EXTREMITIES: dressing of L hip c/d/i, +hematoma left elbow(TTP); no cyanosis, or calf tenderness  NERVOUS SYSTEM: answers questions and follows commands appropriately      LABS:                        10.3   15.21 )-----------( 284      ( 31 May 2021 06:43 )             31.8         140  |  103  |  14  ----------------------------<  103<H>  4.1   |  32<H>  |  0.72    Ca    8.4<L>      31 May 2021 06:43        Urinalysis Basic - ( 29 May 2021 08:53 )    Color: Yellow / Appearance: Clear / S.010 / pH: x  Gluc: x / Ketone: Negative  / Bili: Negative / Urobili: Negative   Blood: x / Protein: 30 mg/dL / Nitrite: Negative   Leuk Esterase: Negative / RBC: 6-10 /HPF / WBC 0-2   Sq Epi: x / Non Sq Epi: Negative / Bacteria: x        RADIOLOGY & ADDITIONAL TESTS: INTERVAL HPI/OVERNIGHT EVENTS:  Patient seen and examined at bedside. States she feels tired, but wants to get out of bed. Complains of mild pain in L leg, which she states worsens if she moves it. Denies any CP, SOB, abd pain.     MEDICATIONS  (STANDING):  allopurinol 100 milliGRAM(s) Oral two times a day  amLODIPine   Tablet 2.5 milliGRAM(s) Oral daily  ascorbic acid 500 milliGRAM(s) Oral two times a day  enoxaparin Injectable 40 milliGRAM(s) SubCutaneous daily  folic acid 1 milliGRAM(s) Oral daily  mometasone 220 MICROgram(s) Inhaler 1 Puff(s) Inhalation daily  multivitamin 1 Tablet(s) Oral daily  pantoprazole    Tablet 40 milliGRAM(s) Oral before breakfast  polyethylene glycol 3350 17 Gram(s) Oral at bedtime  senna 2 Tablet(s) Oral at bedtime  simvastatin 20 milliGRAM(s) Oral at bedtime    MEDICATIONS  (PRN):  acetaminophen   Tablet .. 650 milliGRAM(s) Oral every 6 hours PRN Temp greater or equal to 38C (100.4F), Mild Pain (1 - 3)  ALBUTerol    90 MICROgram(s) HFA Inhaler 2 Puff(s) Inhalation every 6 hours PRN Shortness of Breath and/or Wheezing  benzocaine 15 mG/menthol 3.6 mG (Sugar-Free) Lozenge 1 Lozenge Oral every 2 hours PRN Sore Throat  ondansetron Injectable 4 milliGRAM(s) IV Push every 6 hours PRN Nausea and/or Vomiting  oxyCODONE    IR 2.5 milliGRAM(s) Oral every 4 hours PRN Moderate Pain (4 - 6)  oxyCODONE    IR 5 milliGRAM(s) Oral every 4 hours PRN Severe Pain (7 - 10)      Allergies    No Known Allergies    Intolerances      REVIEW OF SYSTEMS:  CONSTITUTIONAL: No fever or chills  HEENT:  No headache, no sore throat  RESPIRATORY: No cough, wheezing, or shortness of breath  CARDIOVASCULAR: No chest pain, palpitations  GASTROINTESTINAL: admits lower abd pressure, No nausea, vomiting, or diarrhea  GENITOURINARY: No dysuria, frequency, or hematuria; admits hesitancy   NEUROLOGICAL: no focal weakness or dizziness  MUSCULOSKELETAL: L hip pain while WB/movement, admits left elbow pain with palpation    Vital Signs Last 24 Hrs  T(C): 37 (31 May 2021 05:21), Max: 37 (31 May 2021 05:21)  T(F): 98.6 (31 May 2021 05:21), Max: 98.6 (31 May 2021 05:21)  HR: 87 (31 May 2021 05:21) (78 - 92)  BP: 146/67 (31 May 2021 05:21) (117/65 - 155/67)  BP(mean): --  RR: 19 (31 May 2021 05:21) (18 - 19)  SpO2: 93% (31 May 2021 05:21) (93% - 97%)     @ 07:01  -   @ 07:00  --------------------------------------------------------  IN: 360 mL / OUT: 1600 mL / NET: -1240 mL      PHYSICAL EXAM:  GENERAL: NAD, elderly  HEENT:  anicteric, moist mucous membranes  CHEST/LUNG:  CTA b/l, no rales, wheezes, or rhonchi  HEART:  RRR, S1, S2, +systolic murmur   ABDOMEN:  BS+, soft, nontender, nondistended  EXTREMITIES: dressing of L hip c/d/i, +hematoma left elbow(TTP- improving); no cyanosis, or calf tenderness  NERVOUS SYSTEM: answers questions and follows commands appropriately      LABS:                        10.3   15.21 )-----------( 284      ( 31 May 2021 06:43 )             31.8         140  |  103  |  14  ----------------------------<  103<H>  4.1   |  32<H>  |  0.72    Ca    8.4<L>      31 May 2021 06:43        Urinalysis Basic - ( 29 May 2021 08:53 )    Color: Yellow / Appearance: Clear / S.010 / pH: x  Gluc: x / Ketone: Negative  / Bili: Negative / Urobili: Negative   Blood: x / Protein: 30 mg/dL / Nitrite: Negative   Leuk Esterase: Negative / RBC: 6-10 /HPF / WBC 0-2   Sq Epi: x / Non Sq Epi: Negative / Bacteria: x        RADIOLOGY & ADDITIONAL TESTS:

## 2021-05-31 NOTE — DISCHARGE NOTE PROVIDER - NSDCCPCAREPLAN_GEN_ALL_CORE_FT
PRINCIPAL DISCHARGE DIAGNOSIS  Diagnosis: Hip fracture, left  Assessment and Plan of Treatment: You were found to have a L hip fracture   -You were evaluated by the Orthopedic surgeon and had a partial hip replacement   -Pain was controlled and you were given a pillow to keep your legs in the appropriate position after surgery   -You are allowed to walk with full weight bearing as tolerated, with assistive devices (walker/Cane as Needed)  -You were evluated by Physical Therapy who recommended you to go to a rehab facility   -You can ice affected area as needed  -Keep dressing clean and dry  -Continue DVT prophylaxis for 30 days   -Remove Staples Post-Op Day 14, and Remove Dressing Post-Op Day 10, with Daily Dressing Changes as Need.  -Follow up with Dr. Marx as Outpatient in 10-14 Days after Discharge from Rehab. Call Office For Appointment.      SECONDARY DISCHARGE DIAGNOSES  Diagnosis: HLD (hyperlipidemia)  Assessment and Plan of Treatment: -continue home statin    Diagnosis: Gout  Assessment and Plan of Treatment: -continue home allopurinol    Diagnosis: Hypertension  Assessment and Plan of Treatment: -continue home amlodipine    Diagnosis: Asthma with COPD  Assessment and Plan of Treatment: -continue home inhaler

## 2021-05-31 NOTE — PROGRESS NOTE ADULT - PROBLEM SELECTOR PLAN 2
- Per patient, Cardiologist Dr. Oliveira following aortic murmur with frequent TTEs to evaluate for TAVR  - Patient states she is has dyspnea on exertion when walking up stairs, no sob on flat surfaces  - Based on clinical evaluation Aortic stenosis is moderate to severe.  - TTE (5/29): normal left ventricular systolic function with an ejection fraction of 65%.5. There is a trileaflet aortic valve with significant aortic valve calcification with severe aortic valve stenosis. Her mean aortic valve gradient of 64 mmHg and peak aortic valve gradient of 99 mmHg. The aortic valve area was calculated to be 0.72 sq cm. The peak aortic valve velocity is 5 m/s. There is mild aortic insufficiency.  - Cardiology, Dr. Marin, following

## 2021-05-31 NOTE — DISCHARGE NOTE PROVIDER - NSDCACTIVITY_GEN_ALL_CORE
Stairs allowed/Walking - Indoors allowed/Walking - Outdoors allowed Vital Signs Last 24 Hrs  T(C): 36.4 (15 Sep 2019 22:44), Max: 37.9 (15 Sep 2019 15:10)  T(F): 97.5 (15 Sep 2019 22:44), Max: 100.3 (15 Sep 2019 15:10)  HR: 56 (15 Sep 2019 22:44) (50 - 101)  BP: 105/45 (15 Sep 2019 22:44) (85/45 - 116/70)  BP(mean): 63 (15 Sep 2019 22:44) (63 - 63)  RR: 16 (15 Sep 2019 22:44) (16 - 18)  SpO2: 99% (15 Sep 2019 22:44) (98% - 100%)    GENERAL: Chronically ill-appearing  HEAD:  Atraumatic, Normocephalic  EYES: EOMI, PERRLA, conjunctiva and sclera clear  NECK: Supple, No JVD  CHEST/LUNG: Decreased BS b/l lower lung fields; No wheeze/rhonchi/rales   HEART: Bradycardic, normal S1 S2,  No murmurs, rubs, or gallops  ABDOMEN: Distended abdomen, TTP diffusely, no guarding or rebound tenderness. + BS  EXTREMITIES:  2+ Peripheral Pulses, No clubbing, cyanosis, or edema  PSYCH: AAOx3  NEUROLOGY: non-focal  SKIN: No rashes or lesions

## 2021-05-31 NOTE — DISCHARGE NOTE PROVIDER - HOSPITAL COURSE
HPI:  89 yo F PMHx Aortic Stenosis,  HTN, HLD, Asthma/COPD, GERD, Gout, presenting for Left hip pain on ambulating x  2 weeks.  Patient reports that she was instructed to come to the ED today after MRI revealed nondisplaced left hip fracture. Patient reports that she has been ambulating with pain since the fall using a walker. For the pain she has been taking Ecotin 325 two pills a day for pain relief for the hip. She states the hematoma on her elbow is painful only when touched.  Patient reports she has a known history of aortic valve disorder and her Cardiologist Dr. Oliveira is closely following her with TTE to determine if she is a candidate for valve replacement.  Patient states can walk distances on flat surfaces without shortness of breath but does have TITUS on hills or stairs. She states she took all her medications today prior to coming to the hospital tonight.  Currently denies CP, SOB, palpitations, dizziness, headache, nausea, or pain.       ED Course: Patient did not receive any treatments in the ED prior to admission  VS T 97.7 HR 70 /71 RR 18 SpO2 96% on RA    Labs significant for WBC 11.9, Hg 10.5   EKG ; NSR with infrequent PVC and RBBB    (28 May 2021 20:13)      ---  HOSPITAL COURSE:   Patient admitted to [facility] for nondisplaced impacted L femoral neck fracture. Cardiology was consulted given hx of aortic stenosis and a TTE was performed which showed normal LV systolic function with estimated EF of 65%, severe aortic valve stenosis, diastolic dysfunction. Patient was medically optimized for planned orthopedic procedure and had L hip hemiarthroplasty on 5/29/21 with no intraoperative complications. Pain was controlled with tylenol and oxycodone. Pt was evaluated by physical therapy who recommended discharge to [facility]. Patient improved clinically throughout her hospitalization. Patient seen and examined on day of discharge and is medically optimized for discharge to [facility] with close outpatient follow up.     T(C): 36.8 (06-01-21 @ 05:20), Max: 37.7 (05-31-21 @ 20:32)  HR: 75 (06-01-21 @ 05:20) (75 - 87)  BP: 108/59 (06-01-21 @ 05:20) (92/54 - 117/58)  RR: 18 (06-01-21 @ 05:20) (18 - 19)  SpO2: 92% (06-01-21 @ 05:20) (92% - 93%)    GENERAL: patient appears well, no acute distress, conversant  EYES: anicteric sclerae, no exudates  ENMT: oropharynx clear without erythema, no exudates, moist mucous membranes  NECK: supple, soft, no thyromegaly noted  LUNGS: clear to auscultation, no intercostal retractions  HEART: S1/S2, regular rate and rhythm, + systolic murmur noted, no lower extremity edema  GASTROINTESTINAL: abdomen is soft, nontender, nondistended, normoactive bowel sounds, no palpable masses  INTEGUMENT: good skin turgor, warm skin, appears well perfused  MUSCULOSKELETAL: no clubbing or cyanosis, no obvious deformity  NEUROLOGIC: awake, alert, oriented x3, no obvious sensory deficits      ---  CONSULTANTS:   Cardiology - Dr. Marin  Ortho - Dr. Marx    ---  TIME SPENT:  I, the attending physician, was physically present for the key portions of the evaluation and management (E/M) service provided. The total amount of time spent reviewing the hospital notes, laboratory values, imaging findings, assessing/counseling the patient, discussing with consultant physicians, social work, nursing staff was 35 minutes

## 2021-05-31 NOTE — PROGRESS NOTE ADULT - ASSESSMENT
89F with known severe aortic valve stenosis with normal LVEF presents with LEFT hip fracture now s/p repair POD 1.    Suggest:  1.  LEFT hip fracture repair went well.  2. Continue with Zocor  3. Pain mgmt. WBAT as per ortho, DVT prophylaxis  4. Continue with amlodipine.  Would increase to 5 mg if persistent elevated BP despite pain mgmt.  5. Will follow.   89F with known severe aortic valve stenosis with normal LVEF presents with LEFT hip fracture now s/p repair POD 2. BP and HR are good.    Suggest:  1.  LEFT hip fracture repair went well.  2. Continue with Zocor  3. Pain mgmt. WBAT as per ortho, DVT prophylaxis  4. Continue with amlodipine.  Would increase to 5 mg if persistent elevated BP despite pain mgmt.  5. Will follow.

## 2021-05-31 NOTE — PROGRESS NOTE ADULT - ASSESSMENT
A/P:  Patient is a 89y Female s/p L hip nick Stable POD 2    -Medical management per primary team  -Posterior hip precautions  -abd pillow at all times while in bed/chair, Please be sure to keep the abd pillow in place  -h/h stable - transfuse prn  -Ice/Elevate  -Incentive Spirometry  -Multimodal Analgesia  -DVT PE ppx per primary  -SCDs  -PT/OT OOB  -WBAT  -Discharge planning - Rehab  -Will discuss w/ attending and advise if plan changes

## 2021-05-31 NOTE — DISCHARGE NOTE PROVIDER - PROVIDER TOKENS
PROVIDER:[TOKEN:[[id number]]] PROVIDER:[TOKEN:[[id number]]],PROVIDER:[TOKEN:[[id number]],FOLLOWUP:[Routine]],PROVIDER:[TOKEN:[[id number]],FOLLOWUP:[Routine]]

## 2021-05-31 NOTE — DISCHARGE NOTE PROVIDER - CARE PROVIDERS DIRECT ADDRESSES
[email or web address]

## 2021-05-31 NOTE — PROGRESS NOTE ADULT - PROBLEM SELECTOR PLAN 4
- Chronic  - Continue home Amlodipine 2.5 mg with hold parameters

## 2021-06-01 ENCOUNTER — TRANSCRIPTION ENCOUNTER (OUTPATIENT)
Age: 86
End: 2021-06-01

## 2021-06-01 VITALS
RESPIRATION RATE: 20 BRPM | OXYGEN SATURATION: 93 % | HEART RATE: 72 BPM | DIASTOLIC BLOOD PRESSURE: 60 MMHG | SYSTOLIC BLOOD PRESSURE: 123 MMHG | TEMPERATURE: 99 F

## 2021-06-01 LAB
ANION GAP SERPL CALC-SCNC: 6 MMOL/L — SIGNIFICANT CHANGE UP (ref 5–17)
BASOPHILS # BLD AUTO: 0.06 K/UL — SIGNIFICANT CHANGE UP (ref 0–0.2)
BASOPHILS NFR BLD AUTO: 0.4 % — SIGNIFICANT CHANGE UP (ref 0–2)
BUN SERPL-MCNC: 22 MG/DL — SIGNIFICANT CHANGE UP (ref 7–23)
CALCIUM SERPL-MCNC: 8.3 MG/DL — LOW (ref 8.5–10.1)
CHLORIDE SERPL-SCNC: 99 MMOL/L — SIGNIFICANT CHANGE UP (ref 96–108)
CO2 SERPL-SCNC: 32 MMOL/L — HIGH (ref 22–31)
CREAT SERPL-MCNC: 0.75 MG/DL — SIGNIFICANT CHANGE UP (ref 0.5–1.3)
EOSINOPHIL # BLD AUTO: 0.4 K/UL — SIGNIFICANT CHANGE UP (ref 0–0.5)
EOSINOPHIL NFR BLD AUTO: 3 % — SIGNIFICANT CHANGE UP (ref 0–6)
GLUCOSE SERPL-MCNC: 99 MG/DL — SIGNIFICANT CHANGE UP (ref 70–99)
HCT VFR BLD CALC: 29.2 % — LOW (ref 34.5–45)
HGB BLD-MCNC: 9.4 G/DL — LOW (ref 11.5–15.5)
IMM GRANULOCYTES NFR BLD AUTO: 0.6 % — SIGNIFICANT CHANGE UP (ref 0–1.5)
LYMPHOCYTES # BLD AUTO: 15.3 % — SIGNIFICANT CHANGE UP (ref 13–44)
LYMPHOCYTES # BLD AUTO: 2.05 K/UL — SIGNIFICANT CHANGE UP (ref 1–3.3)
MCHC RBC-ENTMCNC: 30.2 PG — SIGNIFICANT CHANGE UP (ref 27–34)
MCHC RBC-ENTMCNC: 32.2 GM/DL — SIGNIFICANT CHANGE UP (ref 32–36)
MCV RBC AUTO: 93.9 FL — SIGNIFICANT CHANGE UP (ref 80–100)
MONOCYTES # BLD AUTO: 1.37 K/UL — HIGH (ref 0–0.9)
MONOCYTES NFR BLD AUTO: 10.2 % — SIGNIFICANT CHANGE UP (ref 2–14)
NEUTROPHILS # BLD AUTO: 9.47 K/UL — HIGH (ref 1.8–7.4)
NEUTROPHILS NFR BLD AUTO: 70.5 % — SIGNIFICANT CHANGE UP (ref 43–77)
NRBC # BLD: 0 /100 WBCS — SIGNIFICANT CHANGE UP (ref 0–0)
PLATELET # BLD AUTO: 260 K/UL — SIGNIFICANT CHANGE UP (ref 150–400)
POTASSIUM SERPL-MCNC: 3.7 MMOL/L — SIGNIFICANT CHANGE UP (ref 3.5–5.3)
POTASSIUM SERPL-SCNC: 3.7 MMOL/L — SIGNIFICANT CHANGE UP (ref 3.5–5.3)
RBC # BLD: 3.11 M/UL — LOW (ref 3.8–5.2)
RBC # FLD: 13.9 % — SIGNIFICANT CHANGE UP (ref 10.3–14.5)
SARS-COV-2 RNA SPEC QL NAA+PROBE: SIGNIFICANT CHANGE UP
SODIUM SERPL-SCNC: 137 MMOL/L — SIGNIFICANT CHANGE UP (ref 135–145)
WBC # BLD: 13.43 K/UL — HIGH (ref 3.8–10.5)
WBC # FLD AUTO: 13.43 K/UL — HIGH (ref 3.8–10.5)

## 2021-06-01 PROCEDURE — 85025 COMPLETE CBC W/AUTO DIFF WBC: CPT

## 2021-06-01 PROCEDURE — 97535 SELF CARE MNGMENT TRAINING: CPT

## 2021-06-01 PROCEDURE — 80048 BASIC METABOLIC PNL TOTAL CA: CPT

## 2021-06-01 PROCEDURE — 99285 EMERGENCY DEPT VISIT HI MDM: CPT

## 2021-06-01 PROCEDURE — 97166 OT EVAL MOD COMPLEX 45 MIN: CPT

## 2021-06-01 PROCEDURE — 85027 COMPLETE CBC AUTOMATED: CPT

## 2021-06-01 PROCEDURE — 97530 THERAPEUTIC ACTIVITIES: CPT

## 2021-06-01 PROCEDURE — 86901 BLOOD TYPING SEROLOGIC RH(D): CPT

## 2021-06-01 PROCEDURE — 85730 THROMBOPLASTIN TIME PARTIAL: CPT

## 2021-06-01 PROCEDURE — 87086 URINE CULTURE/COLONY COUNT: CPT

## 2021-06-01 PROCEDURE — 93306 TTE W/DOPPLER COMPLETE: CPT

## 2021-06-01 PROCEDURE — 86900 BLOOD TYPING SEROLOGIC ABO: CPT

## 2021-06-01 PROCEDURE — 73502 X-RAY EXAM HIP UNI 2-3 VIEWS: CPT

## 2021-06-01 PROCEDURE — U0003: CPT

## 2021-06-01 PROCEDURE — 88305 TISSUE EXAM BY PATHOLOGIST: CPT

## 2021-06-01 PROCEDURE — 88311 DECALCIFY TISSUE: CPT

## 2021-06-01 PROCEDURE — 97116 GAIT TRAINING THERAPY: CPT

## 2021-06-01 PROCEDURE — 86850 RBC ANTIBODY SCREEN: CPT

## 2021-06-01 PROCEDURE — 71045 X-RAY EXAM CHEST 1 VIEW: CPT

## 2021-06-01 PROCEDURE — 36415 COLL VENOUS BLD VENIPUNCTURE: CPT

## 2021-06-01 PROCEDURE — 73501 X-RAY EXAM HIP UNI 1 VIEW: CPT

## 2021-06-01 PROCEDURE — 94640 AIRWAY INHALATION TREATMENT: CPT

## 2021-06-01 PROCEDURE — 93005 ELECTROCARDIOGRAM TRACING: CPT

## 2021-06-01 PROCEDURE — 81001 URINALYSIS AUTO W/SCOPE: CPT

## 2021-06-01 PROCEDURE — 73552 X-RAY EXAM OF FEMUR 2/>: CPT

## 2021-06-01 PROCEDURE — 99239 HOSP IP/OBS DSCHRG MGMT >30: CPT

## 2021-06-01 PROCEDURE — 85610 PROTHROMBIN TIME: CPT

## 2021-06-01 PROCEDURE — U0005: CPT

## 2021-06-01 PROCEDURE — 86769 SARS-COV-2 COVID-19 ANTIBODY: CPT

## 2021-06-01 PROCEDURE — C1776: CPT

## 2021-06-01 RX ORDER — MULTIVIT-MIN/FERROUS GLUCONATE 9 MG/15 ML
1 LIQUID (ML) ORAL
Qty: 0 | Refills: 0 | DISCHARGE

## 2021-06-01 RX ORDER — ONDANSETRON 8 MG/1
4 TABLET, FILM COATED ORAL
Qty: 0 | Refills: 0 | DISCHARGE

## 2021-06-01 RX ORDER — ACETAMINOPHEN 500 MG
2 TABLET ORAL
Qty: 0 | Refills: 0 | DISCHARGE
Start: 2021-06-01

## 2021-06-01 RX ORDER — LIDOCAINE 4 G/100G
0 CREAM TOPICAL
Qty: 0 | Refills: 0 | DISCHARGE
Start: 2021-06-01

## 2021-06-01 RX ORDER — FOLIC ACID 0.8 MG
1 TABLET ORAL
Qty: 0 | Refills: 0 | DISCHARGE
Start: 2021-06-01

## 2021-06-01 RX ORDER — ENOXAPARIN SODIUM 100 MG/ML
40 INJECTION SUBCUTANEOUS
Qty: 0 | Refills: 0 | DISCHARGE
Start: 2021-06-01 | End: 2021-07-03

## 2021-06-01 RX ORDER — SENNA PLUS 8.6 MG/1
2 TABLET ORAL
Qty: 0 | Refills: 0 | DISCHARGE
Start: 2021-06-01

## 2021-06-01 RX ORDER — PANTOPRAZOLE SODIUM 20 MG/1
1 TABLET, DELAYED RELEASE ORAL
Qty: 0 | Refills: 0 | DISCHARGE
Start: 2021-06-01

## 2021-06-01 RX ORDER — ENOXAPARIN SODIUM 100 MG/ML
40 INJECTION SUBCUTANEOUS
Qty: 0 | Refills: 0 | DISCHARGE
Start: 2021-06-01

## 2021-06-01 RX ORDER — ASCORBIC ACID 60 MG
1 TABLET,CHEWABLE ORAL
Qty: 0 | Refills: 0 | DISCHARGE
Start: 2021-06-01

## 2021-06-01 RX ORDER — LIDOCAINE 4 G/100G
1 CREAM TOPICAL DAILY
Refills: 0 | Status: DISCONTINUED | OUTPATIENT
Start: 2021-06-01 | End: 2021-06-01

## 2021-06-01 RX ORDER — OXYCODONE HYDROCHLORIDE 5 MG/1
1 TABLET ORAL
Qty: 0 | Refills: 0 | DISCHARGE
Start: 2021-06-01

## 2021-06-01 RX ORDER — POLYETHYLENE GLYCOL 3350 17 G/17G
17 POWDER, FOR SOLUTION ORAL
Qty: 0 | Refills: 0 | DISCHARGE
Start: 2021-06-01

## 2021-06-01 RX ORDER — OXYCODONE HYDROCHLORIDE 5 MG/1
2.5 TABLET ORAL
Qty: 0 | Refills: 0 | DISCHARGE
Start: 2021-06-01

## 2021-06-01 RX ORDER — LIDOCAINE 4 G/100G
1 CREAM TOPICAL
Qty: 0 | Refills: 0 | DISCHARGE
Start: 2021-06-01

## 2021-06-01 RX ORDER — MOMETASONE FUROATE 220 UG/1
1 INHALANT RESPIRATORY (INHALATION)
Qty: 0 | Refills: 0 | DISCHARGE
Start: 2021-06-01

## 2021-06-01 RX ADMIN — OXYCODONE HYDROCHLORIDE 2.5 MILLIGRAM(S): 5 TABLET ORAL at 01:56

## 2021-06-01 RX ADMIN — Medication 100 MILLIGRAM(S): at 05:35

## 2021-06-01 RX ADMIN — MOMETASONE FUROATE 1 PUFF(S): 220 INHALANT RESPIRATORY (INHALATION) at 07:54

## 2021-06-01 RX ADMIN — OXYCODONE HYDROCHLORIDE 2.5 MILLIGRAM(S): 5 TABLET ORAL at 01:04

## 2021-06-01 RX ADMIN — Medication 1 MILLIGRAM(S): at 12:05

## 2021-06-01 RX ADMIN — LIDOCAINE 1 PATCH: 4 CREAM TOPICAL at 12:05

## 2021-06-01 RX ADMIN — ENOXAPARIN SODIUM 40 MILLIGRAM(S): 100 INJECTION SUBCUTANEOUS at 12:05

## 2021-06-01 RX ADMIN — OXYCODONE HYDROCHLORIDE 5 MILLIGRAM(S): 5 TABLET ORAL at 09:50

## 2021-06-01 RX ADMIN — OXYCODONE HYDROCHLORIDE 5 MILLIGRAM(S): 5 TABLET ORAL at 08:50

## 2021-06-01 RX ADMIN — Medication 500 MILLIGRAM(S): at 05:35

## 2021-06-01 RX ADMIN — Medication 1 TABLET(S): at 12:05

## 2021-06-01 RX ADMIN — PANTOPRAZOLE SODIUM 40 MILLIGRAM(S): 20 TABLET, DELAYED RELEASE ORAL at 05:35

## 2021-06-01 NOTE — PROGRESS NOTE ADULT - PROVIDER SPECIALTY LIST ADULT
Orthopedics
Anesthesia
Hospitalist
Orthopedics
Orthopedics
Cardiology
Cardiology
Hospitalist
Cardiology
Orthopedics
Orthopedics
Hospitalist
Internal Medicine

## 2021-06-01 NOTE — PROGRESS NOTE ADULT - SUBJECTIVE AND OBJECTIVE BOX
Patient seen and examined at bedside. States she feels well. Complaining of mild pain in her R lower leg, where her legs are strapped. Denies any calf pain, negative Andrew's sign on exam. Pain likely MSK in nature. Lidocaine patch applied with good result. Patient to go to [facility] today, report given to MD at [facility].    Physical Exam:  GENERAL: NAD, elderly  HEENT:  anicteric, moist mucous membranes  CHEST/LUNG:  CTA b/l, no rales, wheezes, or rhonchi  HEART:  RRR, S1, S2, +systolic murmur   ABDOMEN:  BS+, soft, nontender, nondistended  EXTREMITIES: dressing of L hip c/d/i, +hematoma left elbow(TTP- improving); no cyanosis, or calf tenderness  NERVOUS SYSTEM: answers questions and follows commands appropriately    Please refer to updated D/C note for further details.

## 2021-06-01 NOTE — PROGRESS NOTE ADULT - ASSESSMENT
A/P:  Patient is a 89y Female s/p L hip nick Stable POD 3    -Medical management per primary team  -Posterior hip precautions  -abd pillow at all times while in bed/chair, Please be sure to keep the abd pillow in place  -h/h stable - transfuse prn  -Ice/Elevate  -Incentive Spirometry  -Multimodal Analgesia  -DVT PE ppx per primary  -SCDs  -PT/OT OOB  -WBAT  -Discharge planning - Rehab when auth/bed available  -Orthopedically stable for DC   -Will discuss w/ attending and advise if plan changes

## 2021-06-01 NOTE — PROGRESS NOTE ADULT - SUBJECTIVE AND OBJECTIVE BOX
ICS Cardiology Progress Note [phone] (Dr. Marin, Ngoc, Heron, Augustin)    CHIEF COMPLAINT: Patient is a 89y old  Female who presents with a chief complaint of Closed Reduction of Left Hip Fracture (01 Jun 2021 06:30)      Follow Up Today: The patient denies any chest discomfort or shortness of breath.    HPI:  89 yo F PMHx Aortic Stenosis,  HTN, HLD, Asthma/COPD, GERD, Gout, presenting for Left hip pain on ambulating x  2 weeks.  Patient reports that she was instructed to come to the ED today after MRI revealed nondisplaced left hip fracture. Patient reports that she has been ambulating with pain since the fall using a walker. For the pain she has been taking Ecotin 325 two pills a day for pain relief for the hip. She states the hematoma on her elbow is painful only when touched.  Patient reports she has a known history of aortic valve disorder and her Cardiologist Dr. Oliveira is closely following her with TTE to determine if she is a candidate for valve replacement.  Patient states can walk distances on flat surfaces without shortness of breath but does have TITUS on hills or stairs. She states she took all her medications today prior to coming to the hospital tonight.  Currently denies CP, SOB, palpitations, dizziness, headache, nausea, or pain.       ED Course: Patient did not receive any treatments in the ED prior to admission  VS T 97.7 HR 70 /71 RR 18 SpO2 96% on RA    Labs significant for WBC 11.9, Hg 10.5   EKG ; NSR with infrequent PVC and RBBB    (28 May 2021 20:13)      PAST MEDICAL & SURGICAL HISTORY:  Hypertension    Asthma    Hyperlipidemia    Lumbar back pain  Pinched nerve    Fractured hip    No significant past surgical history        MEDICATIONS  (STANDING):  allopurinol 100 milliGRAM(s) Oral two times a day  amLODIPine   Tablet 2.5 milliGRAM(s) Oral daily  ascorbic acid 500 milliGRAM(s) Oral two times a day  enoxaparin Injectable 40 milliGRAM(s) SubCutaneous daily  folic acid 1 milliGRAM(s) Oral daily  mometasone 220 MICROgram(s) Inhaler 1 Puff(s) Inhalation daily  multivitamin 1 Tablet(s) Oral daily  pantoprazole    Tablet 40 milliGRAM(s) Oral before breakfast  polyethylene glycol 3350 17 Gram(s) Oral at bedtime  senna 2 Tablet(s) Oral at bedtime  simvastatin 20 milliGRAM(s) Oral at bedtime    MEDICATIONS  (PRN):  acetaminophen   Tablet .. 650 milliGRAM(s) Oral every 6 hours PRN Temp greater or equal to 38C (100.4F), Mild Pain (1 - 3)  ALBUTerol    90 MICROgram(s) HFA Inhaler 2 Puff(s) Inhalation every 6 hours PRN Shortness of Breath and/or Wheezing  benzocaine 15 mG/menthol 3.6 mG (Sugar-Free) Lozenge 1 Lozenge Oral every 2 hours PRN Sore Throat  ondansetron Injectable 4 milliGRAM(s) IV Push every 6 hours PRN Nausea and/or Vomiting  oxyCODONE    IR 2.5 milliGRAM(s) Oral every 4 hours PRN Moderate Pain (4 - 6)  oxyCODONE    IR 5 milliGRAM(s) Oral every 4 hours PRN Severe Pain (7 - 10)      Allergies    No Known Allergies    Intolerances        REVIEW OF SYSTEMS:    All other review of systems is negative unless indicated above    Vital Signs Last 24 Hrs  T(C): 36.8 (01 Jun 2021 05:20), Max: 37.7 (31 May 2021 20:32)  T(F): 98.3 (01 Jun 2021 05:20), Max: 99.9 (31 May 2021 20:32)  HR: 75 (01 Jun 2021 05:20) (75 - 87)  BP: 108/59 (01 Jun 2021 05:20) (92/54 - 117/58)  BP(mean): --  RR: 18 (01 Jun 2021 05:20) (18 - 19)  SpO2: 92% (01 Jun 2021 05:20) (92% - 93%)    I&O's Summary    31 May 2021 07:01  -  01 Jun 2021 07:00  --------------------------------------------------------  IN: 0 mL / OUT: 300 mL / NET: -300 mL        PHYSICAL EXAM:    Constitutional: NAD, awake and alert, well-developed  Eyes:  EOMI,  Pupils round, No oral cyanosis.  HEENT: No exudate or erythema  Pulmonary: Non-labored, breath sounds are clear bilaterally, No wheezing, rales or rhonchi  Cardiovascular: Regular, S1 and S2, No murmurs, rubs, gallops oir clicks  Gastrointestinal: Bowel Sounds present, soft, nontender.   Ext: No significant LE edema with good pulses x 4  Neurological: Alert, no gross focal motor deficits  Skin: No rashes.  Psych:  Mood & affect appropriate    LABS: All Labs Reviewed:                        10.3   15.21 )-----------( 284      ( 31 May 2021 06:43 )             31.8                         9.8    16.00 )-----------( 265      ( 30 May 2021 05:19 )             30.3                         10.2   17.79 )-----------( 286      ( 29 May 2021 18:03 )             31.6     31 May 2021 06:43    140    |  103    |  14     ----------------------------<  103    4.1     |  32     |  0.72   30 May 2021 05:19    139    |  101    |  15     ----------------------------<  128    4.4     |  31     |  0.71   29 May 2021 18:03    140    |  105    |  14     ----------------------------<  152    4.0     |  29     |  0.75     Ca    8.4        31 May 2021 06:43  Ca    8.2        30 May 2021 05:19  Ca    8.3        29 May 2021 18:03            Blood Culture: Organism --  Gram Stain Blood -- Gram Stain --  Specimen Source .Urine Clean Catch (Midstream)  Culture-Blood --            RADIOLOGY/EKG:    Attending Attestation:   20 minutes spent on total encounter; more than 50% of the visit was spent counseling and/or coordinating care by the attending physician.     ASSESSMENT AND PLAN

## 2021-06-01 NOTE — PROGRESS NOTE ADULT - SUBJECTIVE AND OBJECTIVE BOX
Orthopedics     Patient seen and examined at bedside, resting comfortably. No acute events overnight. Pain adequately controlled. Patient feeling well. Denies CP/SOB. No nausea or vomiting. No other acute complaints at this time.    Vital Signs Last 24 Hrs  T(C): 36.8 (01 Jun 2021 05:20), Max: 37.7 (31 May 2021 20:32)  T(F): 98.3 (01 Jun 2021 05:20), Max: 99.9 (31 May 2021 20:32)  HR: 75 (01 Jun 2021 05:20) (75 - 87)  BP: 108/59 (01 Jun 2021 05:20) (92/54 - 117/58)  BP(mean): --  RR: 18 (01 Jun 2021 05:20) (18 - 19)  SpO2: 92% (01 Jun 2021 05:20) (92% - 93%)    Exam:  Gen: NAD, Awake and alert  LLE:  Dressing clean and dry  Abd pillow was not in place this AM  +EHL/FHL/TA/GS  SILT L2-S1  +DP  Calf Soft NT  Compartments soft and compressible

## 2021-06-01 NOTE — PROGRESS NOTE ADULT - NUTRITIONAL ASSESSMENT
This patient has been assessed with a concern for Malnutrition and has been determined to have a diagnosis/diagnoses of Severe protein-calorie malnutrition.    This patient is being managed with:   Diet Regular-  Supplement Feeding Modality:  Oral  Ensure Enlive Cans or Servings Per Day:  1       Frequency:  Two Times a day  Entered: Jun 1 2021 11:17AM    Diet Regular-  Entered: May 30 2021 10:51AM    The following pending diet order is being considered for treatment of Severe protein-calorie malnutrition:null

## 2021-06-01 NOTE — PROGRESS NOTE ADULT - REASON FOR ADMISSION
Closed Reduction of Left Hip Fracture

## 2021-06-01 NOTE — CHART NOTE - NSCHARTNOTEFT_GEN_A_CORE
Assessment: 89 y/o female adm with left hip fx. PMH lumbar back pain, HLD, asthma, HTN. Pt visited at bedside this am. Pt reports that she ate well this am. Food preferences are being honored. Last BM noted on 5/28 (bowel regimen ordered).     Factors impacting intake: [ ] none [ ] nausea  [ ] vomiting [ ] diarrhea [ ] constipation  [ ]chewing problems [ ] swallowing issues  [ x] other: decreased appetite     Diet Presciption: Diet, Regular (05-30-21 @ 10:51)  Diet, Full Liquid (05-29-21 @ 14:38)    Intake: fair     Current Weight: 6/1 110.8#  % Weight Change    Pertinent Medications: MEDICATIONS  (STANDING):  allopurinol 100 milliGRAM(s) Oral two times a day  amLODIPine   Tablet 2.5 milliGRAM(s) Oral daily  ascorbic acid 500 milliGRAM(s) Oral two times a day  enoxaparin Injectable 40 milliGRAM(s) SubCutaneous daily  folic acid 1 milliGRAM(s) Oral daily  lidocaine   Patch 1 Patch Transdermal daily  mometasone 220 MICROgram(s) Inhaler 1 Puff(s) Inhalation daily  multivitamin 1 Tablet(s) Oral daily  pantoprazole    Tablet 40 milliGRAM(s) Oral before breakfast  polyethylene glycol 3350 17 Gram(s) Oral at bedtime  senna 2 Tablet(s) Oral at bedtime  simvastatin 20 milliGRAM(s) Oral at bedtime    MEDICATIONS  (PRN):  acetaminophen   Tablet .. 650 milliGRAM(s) Oral every 6 hours PRN Temp greater or equal to 38C (100.4F), Mild Pain (1 - 3)  ALBUTerol    90 MICROgram(s) HFA Inhaler 2 Puff(s) Inhalation every 6 hours PRN Shortness of Breath and/or Wheezing  benzocaine 15 mG/menthol 3.6 mG (Sugar-Free) Lozenge 1 Lozenge Oral every 2 hours PRN Sore Throat  ondansetron Injectable 4 milliGRAM(s) IV Push every 6 hours PRN Nausea and/or Vomiting  oxyCODONE    IR 2.5 milliGRAM(s) Oral every 4 hours PRN Moderate Pain (4 - 6)  oxyCODONE    IR 5 milliGRAM(s) Oral every 4 hours PRN Severe Pain (7 - 10)    Pertinent Labs: 06-01 Na137 mmol/L Glu 99 mg/dL K+ 3.7 mmol/L Cr  0.75 mg/dL BUN 22 mg/dL     CAPILLARY BLOOD GLUCOSE        Skin: right buttocks Stage II (MVI and Vit C ordered)     Estimated Needs:   [ x] no change since previous assessment  [ ] recalculated:     Previous Nutrition Diagnosis:   [ ] Inadequate Energy Intake [ ]Inadequate Oral Intake [ ] Excessive Energy Intake   [ ] Underweight [ ] Increased Nutrient Needs [ ] Overweight/Obesity   [ ] Altered GI Function [ ] Unintended Weight Loss [ ] Food & Nutrition Related Knowledge Deficit [x ] Malnutrition     Nutrition Diagnosis is [ x] ongoing  [ ] resolved [ ] not applicable     New Nutrition Diagnosis: [x ] not applicable       Interventions:   Recommend  [ ] Change Diet To:  [x ] Nutrition Supplement: rx adding Ensure BID to diet order.   [ ] Nutrition Support  [ x] Other: continue to honor pt's food preferences as obtained from pt.     Monitoring and Evaluation:   [x ] PO intake [ x ] Tolerance to diet prescription [ x ] weights [ x ] labs[ x ] follow up per protocol  [ ] other:

## 2021-06-01 NOTE — DISCHARGE NOTE NURSING/CASE MANAGEMENT/SOCIAL WORK - PATIENT PORTAL LINK FT
You can access the FollowMyHealth Patient Portal offered by [facility] by registering at the following website: [email or web address]. By joining [employer]’s FollowMyHealth portal, you will also be able to view your health information using other applications (apps) compatible with our system.

## 2021-06-01 NOTE — PROGRESS NOTE ADULT - ASSESSMENT
89F with known severe aortic valve stenosis with normal LVEF presents with LEFT hip fracture now s/p repair POD 3. BP and HR are good.    Suggest:  1.  LEFT hip fracture repair went well.  2. Continue with Zocor  3. Pain mgmt. WBAT as per ortho, DVT prophylaxis  4. Continue with amlodipine 2.5mg daily  5. Will follow.

## 2021-08-03 ENCOUNTER — APPOINTMENT (OUTPATIENT)
Dept: WOUND CARE | Facility: CLINIC | Age: 86
End: 2021-08-03

## 2021-08-16 NOTE — ED PROVIDER NOTE - WR ORDER ID 1
CHIEF COMPLAINT:  Chief Complaint   Patient presents with    Right Wrist - Follow-up       SUBJECTIVE:  Holden Angeles is a 5y o  year old female who presents for follow up of right wrist sprain  Patient is now 3 5 weeks out from injury sustained on 07/22/2021 after injuring her wrist and a sleep over  At her last visit on 07/26/2021 we recommended a cock-up wrist brace to be worn in public  Patient has been compliant with the bracing however still does complain of daily pain per mom's report  Overall has improved somewhat  Denies numbness and tingling  Patient offers no additional complaints at this time  PAST MEDICAL HISTORY:  History reviewed  No pertinent past medical history  PAST SURGICAL HISTORY:  History reviewed  No pertinent surgical history  FAMILY HISTORY:  History reviewed  No pertinent family history  SOCIAL HISTORY:  Social History     Tobacco Use    Smoking status: Never Smoker    Smokeless tobacco: Never Used   Substance Use Topics    Alcohol use: Not on file    Drug use: Not on file       MEDICATIONS:  No current outpatient medications on file  ALLERGIES:  No Known Allergies    REVIEW OF SYSTEMS:  Review of Systems   Constitutional: Negative for fever  HENT: Negative for sore throat  Respiratory: Negative for cough and shortness of breath  Cardiovascular: Negative for chest pain and palpitations  Gastrointestinal: Negative for vomiting  Musculoskeletal: Negative for back pain and gait problem  Skin: Negative for rash  Neurological: Negative for numbness  All other systems reviewed and are negative        VITALS:  Vitals:    08/16/21 1411   BP: (!) 99/62   Pulse: 83       LABS:  HgA1c: No results found for: HGBA1C  BMP: No results found for: GLUCOSE, CALCIUM, NA, K, CO2, CL, BUN, CREATININE    _____________________________________________________  PHYSICAL EXAMINATION:  General: well developed and well nourished, alert, oriented times 3 and appears comfortable  Psychiatric: Normal  HEENT: Trachea Midline, No torticollis  Pulmonary: No audible wheezing or strider  Cardiovascular: No discernable arrhythmia   Skin: No masses, erythema, lacerations, fluctation, ulcerations  Neurovascular: Sensation Intact to the Median, Ulnar, Radial Nerve, Motor Intact to the Median, Ulnar, Radial Nerve and Pulses Intact    MUSCULOSKELETAL EXAMINATION:  Right wrist  Skin intact  No erythema or ecchymosis  Mildly tender dorsally  Mild pain with motion and loading of the wrist  Sensation intact to light touch  Brisk capillary refill    ___________________________________________________  STUDIES REVIEWED:  No studies reviewed  PROCEDURES PERFORMED:  No Procedures performed today    _____________________________________________________  ASSESSMENT/PLAN:    Right wrist sprain, now 3 5 weeks out from injury  · Given that she is still experiencing pain so frequently recommend more time in the brace  She should continue wearing it for 3 more weeks  · We did discuss that these can be more of a nagging injury and take some time to start feeling better  · She can come out of the brace for hygiene and gentle motion exercises  · No gym or sports until cleared by physician  · Contact the office with any further questions or concerns prior to next follow-up  · Follow-up in 3 weeks  Follow Up:  Return in about 3 weeks (around 9/6/2021)       To Do Next Visit:  Re-evaluation of current issue    Scribe Attestation    I,:  Arianna Hernandez am acting as a scribe while in the presence of the attending physician :       I,:  Ashley Belle MD personally performed the services described in this documentation    as scribed in my presence : [id number]

## 2022-01-06 NOTE — ED ADULT TRIAGE NOTE - BMI (KG/M2)
CONTINUOUS RENAL REPLACEMENT THERAPY (CRRT)  Daily checks    THERAPY MODE DURATION UF RATE   SLED Continuous 450-550     Switched to 4k, serum is at 3.9    See flow sheet           Writer spoke with patient and informed her that per physician she will need to wait for her upcoming Rheumatology appointment for any orders.  Patient verbalized understanding and will call should she have any further issues prior to her upcoming appointment.  Patient was informed that if she should have any new/worsening issues that she should call to schedule an appointment.   18.9

## 2022-05-04 NOTE — DISCHARGE NOTE PROVIDER - NSDCMRMEDTOKEN_GEN_ALL_CORE_FT
[facility] [address] [facility] initial consultation note    Ryan Bond                                                Chief complaint: Ryan Bond is a 34 y.o.oldfemale came for further evaluation regarding her Insomnia with referral from Dr. Paolo Pfeiffer MD.    Kali Grady:    Sleep/Wake schedule:  Usual time to go to bed during the work/regular day of week: 9 PM  Usual time to wake up during the work//regular day of week: 7:45 AM  Over the weekends her sleep schedule: [x] Remain same. She wakes up at the same time on the weekends to take care of her dogs. She usually falls a sleep in less than: Takes 2 to 3 hours to go to sleep. She is currently taking hydroxyzine 10 mg p.o. nightly for her anxiety disorder. She is also taking Zoloft 100 mg p.o. daily from her family physician. He was diagnosed with vitamin D deficiency. She is currently on supplementation. She is also following with her family physician regarding vitamin D deficiency. She takes naps: Yes. Number of naps per week:  1 to 2 times  During each nap she spends a total of: 30 minutes. She takes approximately 1 hour to go to sleep even during the napping  The naps were reported as refreshing: No.     Sleep Hygiene:    Is the temperature and evironment in her bed room is acceptable to her: Yes. She watches Television in her bed room: Yes. Some times. She read books, study, pay bills etc in the bed: No.  Frequency She wake up during night/sleep: 3-4 times  Majority of nocturnal awakenings are for urination: Yes. Majority of the time she wakes up at nighttime to go to restroom  Difficulty in falling back to sleep after nocturnal awakenings: Yes  . Do you drink coffee: No.       Do you drink caffeinated beverages i.e sodas: No.   Do you drink tea:No.       Do you drink alcoholic beverages: Yes.  Very rarely       History of recreational drug use: No.     Social History Tobacco Use    Smoking status: Never Smoker    Smokeless tobacco: Never Used   Substance Use Topics    Alcohol use: Yes     Comment: rarely    Drug use: No       Sleep apnea symptoms:  Noticed to have loud snoring: No.   Witnessed apneas during sleep noticed: No.   History of choking and gasping sensation at night time: No.  History of headaches in the morning: She gets headaches only during allergy seasons. She is currently using Singulair 10 mg p.o. nightly  History of dry mouth in the morning: No.  History of palpitations during night time/nocturnal awakenings: No.  History of sweating during night time/nocturnal awakenings: Yes      General:  History of head injury in the past: No.   History of seizures: No.   Rest less legs syndrome symptoms:NO  History suggestive of periodic limb movements during sleep: NO  History suggestive of hypnagogic hallucinations: NO  History suggestive of hypnopompic hallucinations: NO  History suggestive of sleep talking:NO  History suggestive of sleep walking:NO  History suggestive of bruxism: No.   History suggestive of cataplexy: NO  History suggestive of sleep paralysis: NO    Family history of sleep disorders:  Family history of obstructive sleep apnea: NO.  Family history of Narcolepsy: NO.  Family history of Rest less legs syndrome : NO.    History regarding old sleep studies:  Prior history of sleep study: No.  Using CPAP device: No.  Currently using home Oxygen: NO.     Patient considerations:  Is the patient is ambulatory: Yes  Patient is currently using: None of these Wheelchair, Kristen Poser or Neo Bald.   Para/Quadriplegic: NO  Hearing deficit : NO  Claustrophobic: NO  MDD : NO  Blind: NO  Incontinent: NO  Para/Quadraplegi: NO.   Need transportation to and from Sleep Center:NO      Social History:  Social History     Tobacco Use    Smoking status: Never Smoker    Smokeless tobacco: Never Used   Substance Use Topics    Alcohol use: Yes     Comment: rarely    Drug use: No   .  She is currently working: Yes. She is currently working for [employer]. 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 She usually goes to her work at: 9 AM  She completes her work at: 12 noon to 2 PM.                         Past Medical History:   Diagnosis Date    Kidney calculi        Past Surgical History:   Procedure Laterality Date    LITHOTRIPSY         No Known Allergies    Current Outpatient Medications   Medication Sig Dispense Refill    montelukast (SINGULAIR) 10 MG tablet Take 10 mg by mouth nightly      sertraline (ZOLOFT) 100 MG tablet Take 100 mg by mouth daily      hydrOXYzine (ATARAX) 10 MG tablet Take 10 mg by mouth nightly      vitamin D (ERGOCALCIFEROL) 1.25 MG (99692 UT) CAPS capsule Take 50,000 Units by mouth once a week      potassium citrate (UROCIT-K) 10 MEQ (1080 MG) extended release tablet Take by mouth in the morning and at bedtime      UNKNOWN TO PATIENT Birth control nexplonan       No current facility-administered medications for this visit. Family History   Problem Relation Age of Onset    Kidney Disease Father     Diabetes Father     Heart Disease Father     High Blood Pressure Father         Review of Systems:   General/Constitutional: He gained approximately 10 pounds of weight in the last 1 year with normal appetite. No fever or chills. HENT: Negative. Eyes: Negative. Upper respiratory tract: No nasal stuffiness or post nasal drip. Lower respiratory tract/ lungs: No cough or sputum production. No hemoptysis. Cardiovascular: No palpitations or chest pain. Gastrointestinal: No nausea or vomiting. Neurological: No focal neurologiacal weakness. Extremities: No edema. Musculoskeletal: No complaints. Genitourinary: No complaints. Hematological: Negative. Psychiatric/Behavioral: Negative. Skin: No itching.     /78 (Site: Left Lower Arm, Position: Sitting, Cuff Size: Medium Adult)   Pulse 84   Temp 97.9 °F (36.6 °C)   Ht 5' 3\" (1.6 m)   Wt 237 lb (107.5 kg)   SpO2 97% Comment: room air at rest  BMI 41.98 kg/m²   BMI:  Body mass index is 41.98 kg/m². Mallampati airway Class: 4  Neck Circumference:.15 Inches  [place] sleepiness score 6/2/22: 5  (On further questioning she gives a history of excessive tiredness. No reported motor vehicle accidents due to her sleepiness). Sleep apnea quality of life questionnaire:52    Physical Exam:  Nursing note and vitals reviewed. Constitutional: Patient appears well built and well nourished. No distress. Patient is oriented to person, place, and time. HENT:   Head: Normocephalic and atraumatic. Right Ear: External ear normal.   Left Ear: External ear normal.   Mouth/Throat: Oropharynx is clear and moist.  No oral thrush. Eyes: Conjunctivae are normal. Pupils are equal, round, and reactive to light. No scleral icterus. Neck: Neck supple. No JVD present. No tracheal deviation present. Cardiovascular: Normal rate, regular rhythm, normal heart sounds. No murmur heard. Pulmonary/Chest: Effort normal and breath sounds normal. No stridor. No respiratory distress. No wheezes. No rales. Patient exhibits no tenderness. Abdominal: Soft. Patient exhibits no distension. No tenderness. Musculoskeletal: Normal range of motion. Extremities: Patient exhibits no edema and no tenderness. Lymphadenopathy:  No cervical adenopathy. Neurological: Patient is alert and oriented to person, place, and time. Skin: Skin is warm and dry. Patient is not diaphoretic. Psychiatric: Patient  has a normal mood and affect. Patient behavior is normal.     Diagnostic Data:  None related sleep. Assessment:  Frequent nocturnal awakenings and excessive tiredness to evaluate for obstructive sleep apnea. -Inadequate sleep hygiene.  -Chronic Insomnia. Differential includes Psychosocial insomnia Vs Insomnia unspecified.   -Anxiety disorder on treatment Zoloft  -Vitamin D deficiency on supplementation. She is currently following with Dr. María Bullock MD.  -Allergic rhinitis on treatment with Singulair 10 mg p.o. nightly    Recommendations/Plan:  -Start patient on Melatonin 3mg PO daily at bed time PRN. She was instructed to not to drive any motor vehicles or operate heavy equipment after taking the pill until sleep symptoms are under control. She was detailed about mechanism of action of drug along with associated side effects. She agreed to take the risk and medication. Was advised to check the product information regarding melatonin safety during pregnancy and lactation. She verbalizes understanding.  -She was educated about sleep restriction therapy and advised to practice to improve her insomnia. -She was educated about stimulus control therapy and advise to practice to improve her insomnia. -Will refer patient for Cognitive behavioral therapy for Insomnia at [facility] in  [facility]- phone number [phone].  -Will schedule patient for polysomnogram in the sleep lab once her insomnia got better to evaluate for sleep apnea. -I had a discussion with patient regarding avialable treatment options for her sleep disorder breathing including but not limited to CPAP titration in the sleep lab Vs.Dental appliance placement with referral to a local dentist Vs other available surgical options including Uvulopalatopharyngoplasty, maxillomandibularostomy, Inspire device placement and tracheostomy as last option. At the end of discussion, she is not decided on her treatment if she found to have obstructive sleep apnea at this time.  -We will see Maci Quintana back in 1week after the sleep study to go over the sleep study results and further management options.  -She was educated to practice good sleep hygiene practices.  She was provided with a good sleep hygiene hand out.  -Mayi Nicole was advised to make earlier appointment with my clinic if she develops any worsening of sleep symptoms. She verbalizes understanding.  -Holley Sandhoff was advised to not to drive any motor vehicles or operate heavy equipment until her sleep symptoms are under good control. Marek Kulkarni Dr verbalizes understanding.  -She was advised to loose weight by controlling diet and doing exercise once cleared by her family physician.   - [address] was educated about my impression and plan. She verbalizes understanding.      -I personally reviewed updated the Past medical hx, Past surgical hx,Social hx, Family hx, Medications, Allergies in the discrete data section of the patient chart along with labs, Pulmonary medicine,Sleep medicine related, Pathological, Microbiological and Radiological investigations. allopurinol 100 mg oral tablet: 1 tab(s) orally 2 times a day  amLODIPine 2.5 mg oral tablet: 1 tab(s) orally once a day  Centrum oral tablet: 1 tab(s) orally once a day  Centrum Silver oral tablet: 1 tab(s) orally once a day  Fish Oil 1000 mg oral capsule: 1 cap(s) orally once a day  Flovent 110 mcg/inh inhalation aerosol with adapter: 1 puff(s) inhaled once a day  NexIUM 20 mg oral delayed release capsule: 1 cap(s) orally once a day  ProAir HFA 90 mcg/inh inhalation aerosol: 2 puff(s) inhaled every 6 hours, As Needed  simvastatin 20 mg oral tablet: 1 tab(s) orally once a day (at bedtime)  Vitamin D2 2000 intl units (50 mcg) oral capsule: 1 tab(s) orally once a day   acetaminophen 325 mg oral tablet: 2 tab(s) orally every 6 hours, As needed, Temp greater or equal to 38C (100.4F), Mild Pain (1 - 3)  allopurinol 100 mg oral tablet: 1 tab(s) orally 2 times a day  amLODIPine 2.5 mg oral tablet: 1 tab(s) orally once a day  ascorbic acid 500 mg oral tablet: 1 tab(s) orally 2 times a day  Centrum oral tablet: 1 tab(s) orally once a day  Centrum Silver oral tablet: 1 tab(s) orally once a day  enoxaparin: 40 milligram(s) subcutaneous once a day  Fish Oil 1000 mg oral capsule: 1 cap(s) orally once a day  Flovent 110 mcg/inh inhalation aerosol with adapter: 1 puff(s) inhaled once a day  folic acid 1 mg oral tablet: 1 tab(s) orally once a day  lidocaine 5% topical film:  topically   mometasone 220 mcg/inh inhalation aerosol powder: 1 puff(s) inhaled once a day  Multiple Vitamins oral tablet: 1 tab(s) orally once a day  NexIUM 20 mg oral delayed release capsule: 1 cap(s) orally once a day  oxyCODONE: 2.5 milligram(s) orally every 4 hours, As Needed  oxyCODONE 5 mg oral tablet: 1 tab(s) orally every 4 hours, As needed, Severe Pain (7 - 10)  pantoprazole 40 mg oral delayed release tablet: 1 tab(s) orally once a day (before a meal)  polyethylene glycol 3350 oral powder for reconstitution: 17 gram(s) orally once a day (at bedtime)  ProAir HFA 90 mcg/inh inhalation aerosol: 2 puff(s) inhaled every 6 hours, As Needed  senna oral tablet: 2 tab(s) orally once a day (at bedtime)  simvastatin 20 mg oral tablet: 1 tab(s) orally once a day (at bedtime)  Vitamin D2 2000 intl units (50 mcg) oral capsule: 1 tab(s) orally once a day  Zofran: 4 milligram(s) intravenous every 6 hours, As Needed   acetaminophen 325 mg oral tablet: 2 tab(s) orally every 6 hours, As needed, Temp greater or equal to 38C (100.4F), Mild Pain (1 - 3)  allopurinol 100 mg oral tablet: 1 tab(s) orally 2 times a day  amLODIPine 2.5 mg oral tablet: 1 tab(s) orally once a day  ascorbic acid 500 mg oral tablet: 1 tab(s) orally 2 times a day  Centrum Silver oral tablet: 1 tab(s) orally once a day  enoxaparin: 40 milligram(s) subcutaneous once a day  Fish Oil 1000 mg oral capsule: 1 cap(s) orally once a day  Flovent 110 mcg/inh inhalation aerosol with adapter: 1 puff(s) inhaled once a day  folic acid 1 mg oral tablet: 1 tab(s) orally once a day  lidocaine 5% topical film: Apply topically to affected area once a day  Multiple Vitamins oral tablet: 1 tab(s) orally once a day  NexIUM 20 mg oral delayed release capsule: 1 cap(s) orally once a day  oxyCODONE: 2.5 milligram(s) orally every 4 hours, As Needed  oxyCODONE 5 mg oral tablet: 1 tab(s) orally every 4 hours, As needed, Severe Pain (7 - 10)  pantoprazole 40 mg oral delayed release tablet: 1 tab(s) orally once a day (before a meal)  polyethylene glycol 3350 oral powder for reconstitution: 17 gram(s) orally once a day (at bedtime)  ProAir HFA 90 mcg/inh inhalation aerosol: 2 puff(s) inhaled every 6 hours, As Needed  senna oral tablet: 2 tab(s) orally once a day (at bedtime)  simvastatin 20 mg oral tablet: 1 tab(s) orally once a day (at bedtime)  Vitamin D2 2000 intl units (50 mcg) oral capsule: 1 tab(s) orally once a day  Zofran: 4 milligram(s) intravenous every 6 hours, As Needed

## 2022-08-24 NOTE — ED PROVIDER NOTE - ATTESTATION, MLM
I have reviewed and confirmed nurses' notes for patient's medications, allergies, medical history, and surgical history. Bi-Rhombic Flap Text: The defect edges were debeveled with a #15 scalpel blade.  Given the location of the defect and the proximity to free margins a bi-rhombic flap was deemed most appropriate.  Using a sterile surgical marker, an appropriate rhombic flap was drawn incorporating the defect. The area thus outlined was incised deep to adipose tissue with a #15 scalpel blade.  The skin margins were undermined to an appropriate distance in all directions utilizing iris scissors.

## 2024-01-16 NOTE — ED ADULT TRIAGE NOTE - MEANS OF ARRIVAL
CC: Patient presents to follow-up from [facility]. HPI: The patient was seen in [facility] about 10 days ago, she was given Levaquin, Depo-Medrol. She states this significantly helped her symptoms and at this point in time she feels her respiratory status is back to normal. 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 At this point in time, the patient does not wish any further evaluation. She has an appointment scheduled with Dr. Lissette Flanagan in about 6 weeks, we discussed going through her chronic problems and ordering labs but she prefers to keep her upcoming appointment with her PCP. She is due for refills and her OARRS report is reviewed and appropriate today. Sahil Tomas was somehow added to her medication list, but this is not in her OARRS report or previous notes so this will not be given to her today. She does report generalized arthralgias. These are stable. She denies any fevers or chills. The patient did not get the sternal x-ray completed which was ordered in December, this was recommended to be completed. ROS:  Const: General health stated as fair. Eyes: Denies eye symptoms. CV: Reports hypertension. Resp: Reports COPD, chronic hypoxemia. : Severe hot flushing Denies urinary problems. Musculo: Reports arthralgias and arthritis. Breast: Denies breast problems. Endocrine: Denies polydipsia, polyphagia and polyuria. Current Outpatient Medications   Medication Sig Dispense Refill    metoprolol succinate (TOPROL XL) 50 MG extended release tablet 1 tab daily 90 tablet 1    estradiol (CLIMARA) 0.05 MG/24HR PLACE 1 PATCH ONTO THE SKIN ONCE WEEKLY 4 patch 5    citalopram (CELEXA) 10 MG tablet take 1 tablet by mouth once daily 30 tablet 5    [START ON 5/26/2021] traMADol (ULTRAM) 50 MG tablet Take 1 tablet by mouth every 6 hours as needed for Pain for up to 30 doses.  30 tablet 0    pantoprazole (PROTONIX) 40 MG tablet Take 1 tablet
by mouth daily 30 tablet 5    ALPRAZolam (XANAX) 0.25 MG tablet take 1 tablet by mouth at bedtime if needed 30 tablet 0    calcium carbonate (OYSTER SHELL CALCIUM 500 MG) 1250 (500 Ca) MG tablet Take 500 mg by mouth      calcium carbonate (TUMS) 500 MG chewable tablet Take 500 mg by mouth      fluticasone-salmeterol (ADVAIR DISKUS) 250-50 MCG/DOSE AEPB Inhale 1 puff into the lungs 2 times daily 60 each 3    albuterol sulfate HFA (VENTOLIN HFA) 108 (90 Base) MCG/ACT inhaler Inhale 2 puffs into the lungs every 4-6 hours as needed for Wheezing or Shortness of Breath 1 Inhaler 5    ipratropium-albuterol (DUONEB) 0.5-2.5 (3) MG/3ML SOLN nebulizer solution Inhale 3 mLs into the lungs every 6 hours as needed for Shortness of Breath 360 mL 5     No current facility-administered medications for this visit. PMH:  Shot Record:  Methp 40-Methylprednisolone Acetate 40 MG 01/12/19  [id number] 80MG NDC [id number] 03/07/11  -Oemwyi (Albuterol & Ipratropium) 02/07/19 12/04/18  -Tntz Medrol To 125 MG Injection 02/07/19  -Srdo Medrol 20MG Injection 11/25/13 06/30/04  90732-Pneumococcal Vaccine (Pneumovax) 09/07/11  90688-Influenza Vaccine- Fluzone Iiv4 Quadrivalent Vial, Ages 3 Yrs + 09/19/17  90658-Influenza Vaccine Fluvirin Iiv3 (ages 3 and older) 09/28/15 09/07/11. Health Maintenance:  Mammogram - (12/2/2015)  Bone Density Test Screening - (5/26/2009)  Mammogram Screening - (12/2/2015)  Colonoscopy - (7/20/2017)  Colonoscopy Screening - (7/20/2017)  Colonoscopy - [address] DX, 2008 DIVERTICULAR DX- 2017 - NEXT 2022  PFT'S - 2004, 9/11 MODERATE TO SEVERE COPD  1/2016-MOD/SEVERE COPD  2D ECHO - 2002  Influenza Vaccination - (2016)  Pneumonia Vaccination - (2011)  Prevnar Vaccine - (2014)  Mammogram Screening - (11/15/2017) slip given  She has had pneumonia in the past.  Also, a questionable pulmonary emboli back in 1994, with no recurrence, of an unidentified source.   HTN- GOOD
RESPONSE TO TOPROL XL  [id number]-5/17/18- DID IMPROVE WITH BRONCODILATOR-REFUSES O2 THERAPY  CHRONIC TOBACCO ABUSE-COUNSELING GIVEN EVERY VISIT  BREAST CYST- NEED REPEAT US IN SPRING 2014  PNEUMONIA 12.2019 - 2.5cm right hilar node  Depression- 7/2020- good response to Celexa  Surgical Hx:  Total Hysterectomy  Knee Replacement - (3/2016)The patient has had a previous total hysterectomy. colonoscopy sankovic 4/08 diverticular dx  Reviewed, no changes. FH:  Noncontributory  Reviewed, no changes. SH: Patient is . Personal Habits: Current cigarette smoker, has smoked 1/2 pack daily. NOW VAPOR Drinks alcohol occasionally. Does not exercise. Reviewed, no changes. Date: 04/22/2019  Was the patient queried about smoking behavior? Yes   Does the patient currently smoke? Smoking: Patient is a current smoker, smokes every day - (11/9/2015) VAPOR. Objective  Vitals:    05/18/21 1353   BP: 130/72   Site: Left Upper Arm   Position: Sitting   Cuff Size: Medium Adult   Pulse: 72   Resp: 20   Temp: 97.4 °F (36.3 °C)   TempSrc: Temporal   SpO2: 93%   Weight: 138 lb (62.6 kg)   Height: 5' 4\" (1.626 m)       Exam:  Const: Appears healthy, Appears frail. Eyes: EOMI in both eyes. PERRL. ENMT: External canals are clear and dry. Tympanic membranes are intact. External nose WNL. Neck: Supple and symmetric. Palpation reveals no adenopathy. No masses appreciated. Thyroid exhibits no nodules  or thyromegaly. No JVD. Resp: Respirations are unlabored. Respiration rate is normal. Auscultate markedly decreased airflow. No adventitious breath sounds. CV: Rhythm is regular. S1 is normal. S2 is normal. No heart murmur appreciated. Extremities: No clubbing, cyanosis or edema. Musculo: Walks with a normal gait for age. Upper Extremities: Crepitation and limitation with movement of the upper  extremities bilaterally. Lower Extremities: Crepitation and limitation of the lower extremities. Skin: Skin is warm and dry.   Neuro:
Alert and oriented x3. Mood is normal. Affect is normal. Speech is articulate and fluent. Reflexes: DTR's are  intact, symmetric and 2+ bilaterally. Psych: Patient's attitude is cooperative. Patient's affect is appropriate. Judgement is realistic. Insight is appropriate. Sienna Leo was seen today for copd and follow-up. Diagnoses and all orders for this visit:    Centrilobular emphysema ([address]    Essential hypertension  -     metoprolol succinate (TOPROL XL) 50 MG extended release tablet; 1 tab daily    Menopause  -     estradiol (CLIMARA) 0.05 MG/24HR; PLACE 1 PATCH ONTO THE SKIN ONCE WEEKLY    Primary osteoarthritis involving multiple joints  -     traMADol (ULTRAM) 50 MG tablet; Take 1 tablet by mouth every 6 hours as needed for Pain for up to 30 doses. Anxiety  -     citalopram (CELEXA) 10 MG tablet; take 1 tablet by mouth once daily  -     ALPRAZolam (XANAX) 0.25 MG tablet; take 1 tablet by mouth at bedtime if needed    Primary insomnia    Other orders  -     pantoprazole (PROTONIX) 40 MG tablet; Take 1 tablet by mouth daily       OARRS report is reviewed and appropriate, medications will be refilled. The tramadol is unable to be filled until the end of the month. The patient will follow up in 6 weeks as scheduled, therefore no lab work will be ordered today. If she has any persistent or new symptoms in terms of respiratory status, she will need to be reevaluated. She verbalizes understanding.
Other, specify...
wheelchair

## 2024-06-26 ENCOUNTER — OFFICE (OUTPATIENT)
Dept: URBAN - METROPOLITAN AREA CLINIC 109 | Facility: CLINIC | Age: 89
Setting detail: OPHTHALMOLOGY
End: 2024-06-26
Payer: MEDICARE

## 2024-06-26 VITALS — HEIGHT: 55 IN

## 2024-06-26 DIAGNOSIS — H01.004: ICD-10-CM

## 2024-06-26 DIAGNOSIS — Q14.8: ICD-10-CM

## 2024-06-26 DIAGNOSIS — H01.001: ICD-10-CM

## 2024-06-26 PROCEDURE — 92201 OPSCPY EXTND RTA DRAW UNI/BI: CPT | Performed by: OPHTHALMOLOGY

## 2024-06-26 PROCEDURE — 92004 COMPRE OPH EXAM NEW PT 1/>: CPT | Performed by: OPHTHALMOLOGY

## 2024-06-26 ASSESSMENT — LID EXAM ASSESSMENTS
OS_BLEPHARITIS: 1+
OD_BLEPHARITIS: 1+

## 2024-06-26 ASSESSMENT — CONFRONTATIONAL VISUAL FIELD TEST (CVF)
OD_FINDINGS: FULL
OS_FINDINGS: FULL

## 2024-10-04 ENCOUNTER — RX ONLY (RX ONLY)
Age: 89
End: 2024-10-04

## 2024-10-04 ENCOUNTER — OFFICE (OUTPATIENT)
Dept: URBAN - METROPOLITAN AREA CLINIC 109 | Facility: CLINIC | Age: 89
Setting detail: OPHTHALMOLOGY
End: 2024-10-04
Payer: MEDICARE

## 2024-10-04 DIAGNOSIS — Q14.8: ICD-10-CM

## 2024-10-04 DIAGNOSIS — H01.001: ICD-10-CM

## 2024-10-04 DIAGNOSIS — H01.004: ICD-10-CM

## 2024-10-04 PROCEDURE — 99213 OFFICE O/P EST LOW 20 MIN: CPT | Performed by: OPHTHALMOLOGY

## 2024-10-04 ASSESSMENT — CONFRONTATIONAL VISUAL FIELD TEST (CVF)
OD_FINDINGS: FULL
OS_FINDINGS: FULL

## 2024-10-04 ASSESSMENT — REFRACTION_CURRENTRX
OS_AXIS: 136
OS_SPHERE: +3.50
OS_OVR_VA: 20/
OS_CYLINDER: -0.50
OD_OVR_VA: 20/
OD_SPHERE: +2.00
OD_CYLINDER: -0.50
OD_AXIS: 097

## 2024-10-04 ASSESSMENT — VISUAL ACUITY
OS_BCVA: 20/30-1
OD_BCVA: 20/30

## 2024-10-04 ASSESSMENT — REFRACTION_MANIFEST
OS_VA1: 20/25
OS_ADD: +2.50
OD_SPHERE: +0.25
OS_SPHERE: +0.50
OD_VA1: 20/40
OD_ADD: +2.50

## 2024-10-04 ASSESSMENT — LID EXAM ASSESSMENTS
OS_BLEPHARITIS: 1+
OD_BLEPHARITIS: 1+

## 2025-04-02 ENCOUNTER — OFFICE (OUTPATIENT)
Dept: URBAN - METROPOLITAN AREA CLINIC 109 | Facility: CLINIC | Age: OVER 89
Setting detail: OPHTHALMOLOGY
End: 2025-04-02
Payer: MEDICARE

## 2025-04-02 VITALS — HEIGHT: 55 IN

## 2025-04-02 DIAGNOSIS — Q14.8: ICD-10-CM

## 2025-04-02 DIAGNOSIS — H01.001: ICD-10-CM

## 2025-04-02 DIAGNOSIS — H01.004: ICD-10-CM

## 2025-04-02 PROCEDURE — 99213 OFFICE O/P EST LOW 20 MIN: CPT | Performed by: OPHTHALMOLOGY

## 2025-04-02 ASSESSMENT — REFRACTION_CURRENTRX
OS_CYLINDER: -0.50
OS_AXIS: 136
OD_CYLINDER: -0.50
OS_SPHERE: +3.50
OD_AXIS: 097
OD_OVR_VA: 20/
OD_SPHERE: +2.00
OS_OVR_VA: 20/

## 2025-04-02 ASSESSMENT — LID EXAM ASSESSMENTS
OD_BLEPHARITIS: 1+
OS_BLEPHARITIS: 1+

## 2025-04-02 ASSESSMENT — REFRACTION_MANIFEST
OD_ADD: +2.50
OS_VA1: 20/25
OD_SPHERE: +0.25
OD_VA1: 20/40
OS_SPHERE: +0.50
OS_ADD: +2.50

## 2025-04-02 ASSESSMENT — VISUAL ACUITY
OD_BCVA: 20/40
OS_BCVA: 20/25-2

## 2025-04-02 ASSESSMENT — CONFRONTATIONAL VISUAL FIELD TEST (CVF)
OD_FINDINGS: FULL
OS_FINDINGS: FULL